# Patient Record
Sex: MALE | Race: ASIAN | NOT HISPANIC OR LATINO | URBAN - METROPOLITAN AREA
[De-identification: names, ages, dates, MRNs, and addresses within clinical notes are randomized per-mention and may not be internally consistent; named-entity substitution may affect disease eponyms.]

---

## 2019-06-27 ENCOUNTER — INPATIENT (INPATIENT)
Facility: HOSPITAL | Age: 69
LOS: 2 days | Discharge: HOME | DRG: 66 | End: 2019-06-30
Attending: INTERNAL MEDICINE | Admitting: INTERNAL MEDICINE
Payer: MEDICARE

## 2019-06-27 VITALS
RESPIRATION RATE: 16 BRPM | TEMPERATURE: 98 F | HEART RATE: 76 BPM | SYSTOLIC BLOOD PRESSURE: 127 MMHG | WEIGHT: 175.05 LBS | OXYGEN SATURATION: 94 % | DIASTOLIC BLOOD PRESSURE: 78 MMHG | HEIGHT: 65 IN

## 2019-06-27 DIAGNOSIS — F17.200 NICOTINE DEPENDENCE, UNSPECIFIED, UNCOMPLICATED: ICD-10-CM

## 2019-06-27 DIAGNOSIS — E78.5 HYPERLIPIDEMIA, UNSPECIFIED: ICD-10-CM

## 2019-06-27 DIAGNOSIS — G45.9 TRANSIENT CEREBRAL ISCHEMIC ATTACK, UNSPECIFIED: ICD-10-CM

## 2019-06-27 DIAGNOSIS — E11.9 TYPE 2 DIABETES MELLITUS WITHOUT COMPLICATIONS: ICD-10-CM

## 2019-06-27 DIAGNOSIS — R20.0 ANESTHESIA OF SKIN: ICD-10-CM

## 2019-06-27 DIAGNOSIS — Z29.9 ENCOUNTER FOR PROPHYLACTIC MEASURES, UNSPECIFIED: ICD-10-CM

## 2019-06-27 DIAGNOSIS — I10 ESSENTIAL (PRIMARY) HYPERTENSION: ICD-10-CM

## 2019-06-27 DIAGNOSIS — F10.20 ALCOHOL DEPENDENCE, UNCOMPLICATED: ICD-10-CM

## 2019-06-27 LAB
ALBUMIN SERPL ELPH-MCNC: 4.2 G/DL — SIGNIFICANT CHANGE UP (ref 3.3–5)
ALP SERPL-CCNC: 50 U/L — SIGNIFICANT CHANGE UP (ref 30–120)
ALT FLD-CCNC: 19 U/L DA — SIGNIFICANT CHANGE UP (ref 10–60)
ANION GAP SERPL CALC-SCNC: 10 MMOL/L — SIGNIFICANT CHANGE UP (ref 5–17)
APTT BLD: 24.4 SEC — LOW (ref 28.5–37)
AST SERPL-CCNC: 26 U/L — SIGNIFICANT CHANGE UP (ref 10–40)
BASOPHILS # BLD AUTO: 0.03 K/UL — SIGNIFICANT CHANGE UP (ref 0–0.2)
BASOPHILS NFR BLD AUTO: 0.5 % — SIGNIFICANT CHANGE UP (ref 0–2)
BILIRUB SERPL-MCNC: 0.5 MG/DL — SIGNIFICANT CHANGE UP (ref 0.2–1.2)
BUN SERPL-MCNC: 18 MG/DL — SIGNIFICANT CHANGE UP (ref 7–23)
CALCIUM SERPL-MCNC: 9.1 MG/DL — SIGNIFICANT CHANGE UP (ref 8.4–10.5)
CHLORIDE SERPL-SCNC: 104 MMOL/L — SIGNIFICANT CHANGE UP (ref 96–108)
CO2 SERPL-SCNC: 26 MMOL/L — SIGNIFICANT CHANGE UP (ref 22–31)
CREAT SERPL-MCNC: 1.07 MG/DL — SIGNIFICANT CHANGE UP (ref 0.5–1.3)
EOSINOPHIL # BLD AUTO: 0.61 K/UL — HIGH (ref 0–0.5)
EOSINOPHIL NFR BLD AUTO: 9.3 % — HIGH (ref 0–6)
GLUCOSE BLDC GLUCOMTR-MCNC: 128 MG/DL — HIGH (ref 70–99)
GLUCOSE BLDC GLUCOMTR-MCNC: 175 MG/DL — HIGH (ref 70–99)
GLUCOSE SERPL-MCNC: 126 MG/DL — HIGH (ref 70–99)
HCT VFR BLD CALC: 39 % — SIGNIFICANT CHANGE UP (ref 39–50)
HGB BLD-MCNC: 13.7 G/DL — SIGNIFICANT CHANGE UP (ref 13–17)
IMM GRANULOCYTES NFR BLD AUTO: 0.3 % — SIGNIFICANT CHANGE UP (ref 0–1.5)
INR BLD: 0.91 RATIO — SIGNIFICANT CHANGE UP (ref 0.88–1.16)
LYMPHOCYTES # BLD AUTO: 1.45 K/UL — SIGNIFICANT CHANGE UP (ref 1–3.3)
LYMPHOCYTES # BLD AUTO: 22 % — SIGNIFICANT CHANGE UP (ref 13–44)
MCHC RBC-ENTMCNC: 32.6 PG — SIGNIFICANT CHANGE UP (ref 27–34)
MCHC RBC-ENTMCNC: 35.1 GM/DL — SIGNIFICANT CHANGE UP (ref 32–36)
MCV RBC AUTO: 92.9 FL — SIGNIFICANT CHANGE UP (ref 80–100)
MONOCYTES # BLD AUTO: 0.6 K/UL — SIGNIFICANT CHANGE UP (ref 0–0.9)
MONOCYTES NFR BLD AUTO: 9.1 % — SIGNIFICANT CHANGE UP (ref 2–14)
NEUTROPHILS # BLD AUTO: 3.87 K/UL — SIGNIFICANT CHANGE UP (ref 1.8–7.4)
NEUTROPHILS NFR BLD AUTO: 58.8 % — SIGNIFICANT CHANGE UP (ref 43–77)
NRBC # BLD: 0 /100 WBCS — SIGNIFICANT CHANGE UP (ref 0–0)
PLATELET # BLD AUTO: 199 K/UL — SIGNIFICANT CHANGE UP (ref 150–400)
POTASSIUM SERPL-MCNC: 3.8 MMOL/L — SIGNIFICANT CHANGE UP (ref 3.5–5.3)
POTASSIUM SERPL-SCNC: 3.8 MMOL/L — SIGNIFICANT CHANGE UP (ref 3.5–5.3)
PROT SERPL-MCNC: 7.7 G/DL — SIGNIFICANT CHANGE UP (ref 6–8.3)
PROTHROM AB SERPL-ACNC: 9.9 SEC — LOW (ref 10–12.9)
RBC # BLD: 4.2 M/UL — SIGNIFICANT CHANGE UP (ref 4.2–5.8)
RBC # FLD: 11.9 % — SIGNIFICANT CHANGE UP (ref 10.3–14.5)
SODIUM SERPL-SCNC: 140 MMOL/L — SIGNIFICANT CHANGE UP (ref 135–145)
WBC # BLD: 6.58 K/UL — SIGNIFICANT CHANGE UP (ref 3.8–10.5)
WBC # FLD AUTO: 6.58 K/UL — SIGNIFICANT CHANGE UP (ref 3.8–10.5)

## 2019-06-27 PROCEDURE — 93880 EXTRACRANIAL BILAT STUDY: CPT | Mod: 26

## 2019-06-27 PROCEDURE — 93010 ELECTROCARDIOGRAM REPORT: CPT

## 2019-06-27 PROCEDURE — 70450 CT HEAD/BRAIN W/O DYE: CPT | Mod: 26

## 2019-06-27 PROCEDURE — 99285 EMERGENCY DEPT VISIT HI MDM: CPT

## 2019-06-27 PROCEDURE — 71046 X-RAY EXAM CHEST 2 VIEWS: CPT | Mod: 26

## 2019-06-27 RX ORDER — DEXTROSE 50 % IN WATER 50 %
25 SYRINGE (ML) INTRAVENOUS ONCE
Refills: 0 | Status: DISCONTINUED | OUTPATIENT
Start: 2019-06-27 | End: 2019-06-30

## 2019-06-27 RX ORDER — GLUCAGON INJECTION, SOLUTION 0.5 MG/.1ML
1 INJECTION, SOLUTION SUBCUTANEOUS ONCE
Refills: 0 | Status: DISCONTINUED | OUTPATIENT
Start: 2019-06-27 | End: 2019-06-30

## 2019-06-27 RX ORDER — DEXTROSE 50 % IN WATER 50 %
15 SYRINGE (ML) INTRAVENOUS ONCE
Refills: 0 | Status: DISCONTINUED | OUTPATIENT
Start: 2019-06-27 | End: 2019-06-30

## 2019-06-27 RX ORDER — ATORVASTATIN CALCIUM 80 MG/1
1 TABLET, FILM COATED ORAL
Qty: 0 | Refills: 0 | DISCHARGE

## 2019-06-27 RX ORDER — ENOXAPARIN SODIUM 100 MG/ML
40 INJECTION SUBCUTANEOUS DAILY
Refills: 0 | Status: DISCONTINUED | OUTPATIENT
Start: 2019-06-27 | End: 2019-06-30

## 2019-06-27 RX ORDER — SODIUM CHLORIDE 9 MG/ML
1000 INJECTION, SOLUTION INTRAVENOUS
Refills: 0 | Status: DISCONTINUED | OUTPATIENT
Start: 2019-06-27 | End: 2019-06-30

## 2019-06-27 RX ORDER — ATORVASTATIN CALCIUM 80 MG/1
40 TABLET, FILM COATED ORAL AT BEDTIME
Refills: 0 | Status: DISCONTINUED | OUTPATIENT
Start: 2019-06-27 | End: 2019-06-30

## 2019-06-27 RX ORDER — ASPIRIN/CALCIUM CARB/MAGNESIUM 324 MG
162 TABLET ORAL DAILY
Refills: 0 | Status: DISCONTINUED | OUTPATIENT
Start: 2019-06-27 | End: 2019-06-30

## 2019-06-27 RX ORDER — METFORMIN HYDROCHLORIDE 850 MG/1
0 TABLET ORAL
Qty: 0 | Refills: 0 | DISCHARGE

## 2019-06-27 RX ORDER — DEXTROSE 50 % IN WATER 50 %
12.5 SYRINGE (ML) INTRAVENOUS ONCE
Refills: 0 | Status: DISCONTINUED | OUTPATIENT
Start: 2019-06-27 | End: 2019-06-30

## 2019-06-27 RX ORDER — INSULIN LISPRO 100/ML
VIAL (ML) SUBCUTANEOUS
Refills: 0 | Status: DISCONTINUED | OUTPATIENT
Start: 2019-06-27 | End: 2019-06-30

## 2019-06-27 RX ORDER — FOLIC ACID 0.8 MG
1 TABLET ORAL DAILY
Refills: 0 | Status: DISCONTINUED | OUTPATIENT
Start: 2019-06-27 | End: 2019-06-30

## 2019-06-27 RX ORDER — THIAMINE MONONITRATE (VIT B1) 100 MG
100 TABLET ORAL DAILY
Refills: 0 | Status: DISCONTINUED | OUTPATIENT
Start: 2019-06-27 | End: 2019-06-30

## 2019-06-27 RX ORDER — LOSARTAN POTASSIUM 100 MG/1
25 TABLET, FILM COATED ORAL DAILY
Refills: 0 | Status: DISCONTINUED | OUTPATIENT
Start: 2019-06-27 | End: 2019-06-30

## 2019-06-27 RX ORDER — SODIUM CHLORIDE 9 MG/ML
3 INJECTION INTRAMUSCULAR; INTRAVENOUS; SUBCUTANEOUS ONCE
Refills: 0 | Status: COMPLETED | OUTPATIENT
Start: 2019-06-27 | End: 2019-06-27

## 2019-06-27 RX ORDER — DOXAZOSIN MESYLATE 4 MG
1 TABLET ORAL AT BEDTIME
Refills: 0 | Status: DISCONTINUED | OUTPATIENT
Start: 2019-06-27 | End: 2019-06-30

## 2019-06-27 RX ORDER — METFORMIN HYDROCHLORIDE 850 MG/1
1000 TABLET ORAL DAILY
Refills: 0 | Status: DISCONTINUED | OUTPATIENT
Start: 2019-06-27 | End: 2019-06-30

## 2019-06-27 RX ORDER — TERAZOSIN HYDROCHLORIDE 10 MG/1
1 CAPSULE ORAL
Qty: 0 | Refills: 0 | DISCHARGE

## 2019-06-27 RX ORDER — LOSARTAN POTASSIUM 100 MG/1
1 TABLET, FILM COATED ORAL
Qty: 0 | Refills: 0 | DISCHARGE

## 2019-06-27 RX ADMIN — SODIUM CHLORIDE 3 MILLILITER(S): 9 INJECTION INTRAMUSCULAR; INTRAVENOUS; SUBCUTANEOUS at 14:41

## 2019-06-27 RX ADMIN — ATORVASTATIN CALCIUM 40 MILLIGRAM(S): 80 TABLET, FILM COATED ORAL at 23:00

## 2019-06-27 RX ADMIN — Medication 162 MILLIGRAM(S): at 18:58

## 2019-06-27 RX ADMIN — ENOXAPARIN SODIUM 40 MILLIGRAM(S): 100 INJECTION SUBCUTANEOUS at 18:58

## 2019-06-27 RX ADMIN — Medication 1 MILLIGRAM(S): at 23:00

## 2019-06-27 NOTE — ED ADULT NURSE REASSESSMENT NOTE - NS ED NURSE REASSESS COMMENT FT1
pt resting comfortably, in no acute distress. Respirations are even and unlabored. pt voices no complaints at this time. pt and family updated and aware of plan of care. will cont to monitor.

## 2019-06-27 NOTE — H&P ADULT - NEUROLOGICAL DETAILS
responds to pain/deep reflexes intact/cranial nerves intact/sensation intact/responds to verbal commands/alert and oriented x 3

## 2019-06-27 NOTE — ED ADULT TRIAGE NOTE - CHIEF COMPLAINT QUOTE
according to son-in-law  Pt c/o tingling in his rt 1,2,3 fingers & numbness rt side of face since 2AM Speech clear. RON lazar

## 2019-06-27 NOTE — ED ADULT NURSE NOTE - CHPI ED NUR SYMPTOMS NEG
no dizziness/no fever/no confusion/no blurred vision/no weakness/no change in level of consciousness/no vomiting/no nausea

## 2019-06-27 NOTE — H&P ADULT - HISTORY OF PRESENT ILLNESS
69yo male with pmhx of DM2, htn, hld on asa c/o numbness since 2am. pt reports r cheek numbness with r hand "first 3 fingers" numbness. pt reports neverhad previous symptoms in the past. pt denies fever, vision changes, neck pain, headache, n/v.No weakness.  No Fall or LOC.  No c/o Headache or nausea vomiting.  Pt is Chinese speaking. Interview was conducted with daughter's help at bedside.  In Ed /78, Hr 78, afebrile, Ct head negative.  had Neuro consult-NIHSS - 1TIA/CVA in Right thlamic area clinically.Pt is not a tPa candidate secondary to woke up with the symptoms.  Admit to monitored bed.  Was taking Ecotrin 81 mg at home would change to Ecotrin 162.  Lipitor 40  MRI Brain/Carotid doppler/2D Echo.  Lipid panel/HbA1c.  Dysphagia eval in ED.  PT Eval.

## 2019-06-27 NOTE — ED ADULT NURSE NOTE - OBJECTIVE STATEMENT
recd pt  A/Ox3, as per son-in-law  Pt went to sleep at 9pm and woke up @ 200am c/o tingling to his right hand & numbness to his rt side of face, Speech clear. MAEx4 with strength and purpose. pt is everyday smoker, denies chest pain or sob. Respirations are even and unlabored, lungs cta, +bowel x4 quads, abdomen soft, nontender/nondistended, no guarding, rebound or rigidity noted, skin w/d/i.

## 2019-06-27 NOTE — ED PROVIDER NOTE - PROGRESS NOTE DETAILS
Montserrat YANG for ED attending, Dr. Aguilar : 69 y/o male chinese speaking, c/o numbness to right cheek and right three fingers since 2am. Denies HA, fever, nausea, vomiting, visual disturbance, or other sx. hx of DM2, HTN, smoker, denies prior similar sx.  PE: VSS, afebrile, NAD, PERRL, EOMI, neck supple, heart and lungs nl, abd soft, extremities FROM intact, neuro awake and alert, no opal deficits, ? sensory deficit right cheek and right 1st, 2nd, and 3rd digit but no weakness, otherwise nl exam  plan - CT bran. labs, nu consult. labs and imaging reviewed. consulted neuro dr cooper who advised admit. consulted dr sellers will admit.

## 2019-06-27 NOTE — ED PROVIDER NOTE - CLINICAL SUMMARY MEDICAL DECISION MAKING FREE TEXT BOX
pt with hand and facial numbness, no c-spine tenderness.will do labs, ct head to r/o cva, ekg, cxr, consult neuro

## 2019-06-27 NOTE — ED PROVIDER NOTE - OBJECTIVE STATEMENT
pt is a 67yo male with pmhx of DM2, htn, hld on asa c/o numbness since 2am. pt reports r cheek numbness with r hand "first 3 fingers" numbness. pt reports never had previous symptoms in the past. pt is a 67yo male with pmhx of DM2, htn, hld on asa c/o numbness since 2am. pt reports r cheek numbness with r hand "first 3 fingers" numbness. pt reports never had previous symptoms in the past. pt denies fever, vision changes, neck pain, headache, n/v.

## 2019-06-27 NOTE — ED PROVIDER NOTE - PHYSICAL EXAMINATION
Neuro- A&Ox3. Speech clear, without articulation or word-finding difficulties. Eyes- PERRL bilaterally. EOMs in tact. No nystagmus. CN II-XII in tact. No facial droop. decreased sensation r hand digits  No motor deficits throughout extremities bilaterally. Strength 5/5 throughout upper and lower extremities. No pronator drift. Gait stable. Neuro- A&Ox3. Speech clear, without articulation or word-finding difficulties. Eyes- PERRL bilaterally. EOMs in tact. No nystagmus. CN II-XII in tact. No facial droop. decreased sensation  r cheek and r hand digits 1-3. from normal sensation rest of digits.   No motor deficits throughout extremities bilaterally. Strength 5/5 throughout upper and lower extremities. No pronator drift. Gait stable.    Spine Exam:     Cervical: No erythema, ecchymosis, or visible deformity. No midline tenderness or step-off appreciated on palpation. No paravertebral tenderness. No muscle spasm. FROM. NEG NEXUS criteria.

## 2019-06-27 NOTE — CONSULT NOTE ADULT - SUBJECTIVE AND OBJECTIVE BOX
Patient is a 68y old  Male who presents with a chief complaint of Left face and hand numbness    HPI: Pt is a 67yo male with PMHx of DM2, HTN, Hypercholesterolemia came to ED with c/o numbness since 2am. Pt reports Right cheek numbness with Right hand "first 3 fingers" numbness. pt reports never had previous symptoms in the past. Pt denies fever, vision changes, neck pain, headache, n/v.  No weakness.  No Fall or LOC.  No c/o Headache or nausea vomiting.  Pt is Chinese speaking. Interview was conducted with  on phone -     PAST MEDICAL & SURGICAL HISTORY:  Dyslipidemia  DM (diabetes mellitus)  HTN (hypertension)  No significant past surgical history      Home Medications:     * Patient Currently Takes Medications as of 27-Jun-2019 14:23 documented in Structured Notes  · 	Hytrin 2 mg oral capsule: Last Dose Taken:  , 1 cap(s) orally once a day (at bedtime)  · 	metFORMIN 1000 mg oral tablet: Last Dose Taken:  , orally once a day  · 	losartan 25 mg oral tablet: Last Dose Taken:  , 1 tab(s) orally once a day  · 	aspirin 81 mg oral tablet: Last Dose Taken:  , 1 tab(s) orally once a day          · 	atorvastatin 40 mg oral tablet: Last Dose Taken:  , 1 tab(s) orally once a day    Allergies    No Known Allergies    SOCIAL HISTORY:    No h/o Smoking.   No h/o alcohol use.    FAMILY HISTORY: As per chart. N/C.    REVIEW OF SYSTEMS:    CONSTITUTIONAL: No fever  EYES: No eye pain,   ENMT:  No sinus or throat pain  NECK: No pain or stiffness  RESPIRATORY: No cough, No hemoptysis; No shortness of breath  CARDIOVASCULAR: No acute chest pain, palpitations,  or leg swelling  GASTROINTESTINAL: No abdominal pain. No nausea, vomiting, or hematemesis;  No melena or hematochezia.  GENITOURINARY: No  hematuria, or incontinence  MUSCULOSKELETAL: No joint swelling; No extremity pain  SKIN: No itching, rashes, or lesions   LYMPH NODES: No enlarged glands  NEUROLOGICAL: No headaches, memory loss,   PSYCHIATRIC: No depression, anxiety, mood swings, or difficulty sleeping  ENDOCRINE: No heat or cold intolerance;   HEME/LYMPH: No easy bruising, or bleeding gums  Allergy/Immunology. No medication allergy. No seasonal allergies.    PHYSICAL EXAM:  Vital Signs Last 24 Hrs  T(F): 98.5 (06-27-19 @ 15:23)  HR: 70 (06-27-19 @ 15:23)  BP: 128/73 (06-27-19 @ 15:23)  RR: 17 (06-27-19 @ 15:23)    GENERAL: NAD, well-groomed, well-developed  HEAD:  Atraumatic, Normocephalic  EYES: EOMI, PERRLA, conjunctiva and sclera clear  NECK: Supple, No JVD, thyroid non-palpable    On Neurological Examination:    Mental Status - Pt is alert, awake, oriented X3. Higher functions are intact.  Follows commands well and able to answer questions appropriately.    Speech -  Normal. Pt has no aphasia.    Cranial Nerves - Pupils 3 mm equal and reactive to light, extraocular eye movements intact. Pt has no visual field deficit. Pt has no facial asymmetry. Tongue - is in midline.    Motor Exam - 4 plus/5 all over, No drift. No shaking or tremors. Muscle tone - is normal all over. Moves all extremities equally. No asymmetry is seen.      Sensory Exam - Pt withdraws all extremities equally on stimulation. No asymmetry seen. Complaints of tingling, numbness on left face and left hand 3 fingers numbness    Gait - Able to stand and walk unassisted.     Deep tendon Reflexes - 2 plus all over.    Coordination - Fine finger movements are normal on both sides. Finger to nose is also normal on both sides.       Romberg - Negative.    Neck Supple -  Yes.    LABS:                        13.7   6.58  )-----------( 199      ( 27 Jun 2019 14:42 )             39.0     06-27    140  |  104  |  18  ----------------------------<  126<H>  3.8   |  26  |  1.07    Ca    9.1      27 Jun 2019 14:42    TPro  7.7  /  Alb  4.2  /  TBili  0.5  /  DBili  x   /  AST  26  /  ALT  19  /  AlkPhos  50  06-27    PT/INR - ( 27 Jun 2019 14:42 )   PT: 9.9 sec;   INR: 0.91 ratio      PTT - ( 27 Jun 2019 14:42 )  PTT:24.4 sec    RADIOLOGY & ADDITIONAL STUDIES:    < from: CT Head No Cont (06.27.19 @ 14:52) >    No evidence of acute intracranial hemorrhage, midline shift or CT   evidence of acute territorial infarct.    < end of copied text > Patient is a 68y old  Male who presents with a chief complaint of Right face and hand numbness    HPI: Pt is a 67yo male with PMHx of DM2, HTN, Hypercholesterolemia came to ED with c/o numbness since 2am. Pt reports Right cheek numbness with Right hand "first 3 fingers" numbness. pt reports never had previous symptoms in the past. Pt denies fever, vision changes, neck pain, headache, n/v.  No weakness.  No Fall or LOC.  No c/o Headache or nausea vomiting.  Pt is Chinese speaking. Interview was conducted with daughter's help at bedside.    PAST MEDICAL & SURGICAL HISTORY:  Dyslipidemia  DM (diabetes mellitus)  HTN (hypertension)  No significant past surgical history      Home Medications:     * Patient Currently Takes Medications as of 27-Jun-2019 14:23 documented in Structured Notes  · 	Hytrin 2 mg oral capsule: Last Dose Taken:  , 1 cap(s) orally once a day (at bedtime)  · 	metFORMIN 1000 mg oral tablet: Last Dose Taken:  , orally once a day  · 	losartan 25 mg oral tablet: Last Dose Taken:  , 1 tab(s) orally once a day  · 	aspirin 81 mg oral tablet: Last Dose Taken:  , 1 tab(s) orally once a day          · 	atorvastatin 40 mg oral tablet: Last Dose Taken:  , 1 tab(s) orally once a day    Allergies    No Known Allergies    SOCIAL HISTORY:    No h/o Smoking.   No h/o alcohol use.    FAMILY HISTORY: As per chart. N/C.    REVIEW OF SYSTEMS:    CONSTITUTIONAL: No fever  EYES: No eye pain,   ENMT:  No sinus or throat pain  NECK: No pain or stiffness  RESPIRATORY: No cough, No hemoptysis; No shortness of breath  CARDIOVASCULAR: No acute chest pain, palpitations,  or leg swelling  GASTROINTESTINAL: No abdominal pain. No nausea, vomiting, or hematemesis;  No melena or hematochezia.  GENITOURINARY: No  hematuria, or incontinence  MUSCULOSKELETAL: No joint swelling; No extremity pain  SKIN: No itching, rashes, or lesions   LYMPH NODES: No enlarged glands  NEUROLOGICAL: No headaches, memory loss,   PSYCHIATRIC: No depression, anxiety, mood swings, or difficulty sleeping  ENDOCRINE: No heat or cold intolerance;   HEME/LYMPH: No easy bruising, or bleeding gums  Allergy/Immunology. No medication allergy. No seasonal allergies.    PHYSICAL EXAM:  Vital Signs Last 24 Hrs  T(F): 98.5 (06-27-19 @ 15:23)  HR: 70 (06-27-19 @ 15:23)  BP: 128/73 (06-27-19 @ 15:23)  RR: 17 (06-27-19 @ 15:23)    GENERAL: NAD, well-groomed, well-developed  HEAD:  Atraumatic, Normocephalic  EYES: EOMI, PERRLA, conjunctiva and sclera clear  NECK: Supple, No JVD, thyroid non-palpable    On Neurological Examination:    Mental Status - Pt is alert, awake, oriented X3. Higher functions are intact.  Follows commands well and able to answer questions appropriately.    Speech -  Normal. Pt has no aphasia.    Cranial Nerves - Pupils 3 mm equal and reactive to light, extraocular eye movements intact. Pt has no visual field deficit. Pt has no facial asymmetry. Tongue - is in midline.    Motor Exam - 4 plus/5 all over, No drift. No shaking or tremors. Muscle tone - is normal all over. Moves all extremities equally. No asymmetry is seen.      Sensory Exam - Pt withdraws all extremities equally on stimulation. No asymmetry seen. Complaints of tingling, numbness on left face and left hand 3 fingers numbness    Gait - Able to stand and walk unassisted.     Deep tendon Reflexes - 2 plus all over.    Coordination - Fine finger movements are normal on both sides. Finger to nose is also normal on both sides.       Romberg - Negative.    Neck Supple -  Yes.    LABS:                        13.7   6.58  )-----------( 199      ( 27 Jun 2019 14:42 )             39.0     06-27    140  |  104  |  18  ----------------------------<  126<H>  3.8   |  26  |  1.07    Ca    9.1      27 Jun 2019 14:42    TPro  7.7  /  Alb  4.2  /  TBili  0.5  /  DBili  x   /  AST  26  /  ALT  19  /  AlkPhos  50  06-27    PT/INR - ( 27 Jun 2019 14:42 )   PT: 9.9 sec;   INR: 0.91 ratio      PTT - ( 27 Jun 2019 14:42 )  PTT:24.4 sec    RADIOLOGY & ADDITIONAL STUDIES:    < from: CT Head No Cont (06.27.19 @ 14:52) >    No evidence of acute intracranial hemorrhage, midline shift or CT   evidence of acute territorial infarct.    < end of copied text > Patient is a 68y old  Male who presents with a chief complaint of Right face and hand numbness    HPI: Pt is a 69yo male with PMHx of DM2, HTN, Hypercholesterolemia came to ED with c/o numbness since 2am. Pt reports Right cheek numbness with Right hand "first 3 fingers" numbness. pt reports never had previous symptoms in the past. Pt denies fever, vision changes, neck pain, headache, n/v.  No weakness.  No Fall or LOC.  No c/o Headache or nausea vomiting.  Pt is Chinese speaking. Interview was conducted with daughter's help at bedside.    PAST MEDICAL & SURGICAL HISTORY:  Dyslipidemia  DM (diabetes mellitus)  HTN (hypertension)  No significant past surgical history      Home Medications:     * Patient Currently Takes Medications as of 27-Jun-2019 14:23 documented in Structured Notes  · 	Hytrin 2 mg oral capsule: Last Dose Taken:  , 1 cap(s) orally once a day (at bedtime)  · 	metFORMIN 1000 mg oral tablet: Last Dose Taken:  , orally once a day  · 	losartan 25 mg oral tablet: Last Dose Taken:  , 1 tab(s) orally once a day  · 	aspirin 81 mg oral tablet: Last Dose Taken:  , 1 tab(s) orally once a day          · 	atorvastatin 40 mg oral tablet: Last Dose Taken:  , 1 tab(s) orally once a day    Allergies    No Known Allergies    SOCIAL HISTORY:    Currently smokes  Drinks home made rice wine 2 times a day.    FAMILY HISTORY: As per chart. N/C.    REVIEW OF SYSTEMS:    CONSTITUTIONAL: No fever  EYES: No eye pain,   ENMT:  No sinus or throat pain  NECK: No pain or stiffness  RESPIRATORY: No cough, No hemoptysis; No shortness of breath  CARDIOVASCULAR: No acute chest pain, palpitations,  or leg swelling  GASTROINTESTINAL: No abdominal pain. No nausea, vomiting, or hematemesis;  No melena or hematochezia.  GENITOURINARY: No  hematuria, or incontinence  MUSCULOSKELETAL: No joint swelling; No extremity pain  SKIN: No itching, rashes, or lesions   LYMPH NODES: No enlarged glands  NEUROLOGICAL: No headaches, memory loss,   PSYCHIATRIC: No depression, anxiety, mood swings, or difficulty sleeping  ENDOCRINE: No heat or cold intolerance;   HEME/LYMPH: No easy bruising, or bleeding gums  Allergy/Immunology. No medication allergy. No seasonal allergies.    PHYSICAL EXAM:  Vital Signs Last 24 Hrs  T(F): 98.5 (06-27-19 @ 15:23)  HR: 70 (06-27-19 @ 15:23)  BP: 128/73 (06-27-19 @ 15:23)  RR: 17 (06-27-19 @ 15:23)    GENERAL: NAD, well-groomed, well-developed  HEAD:  Atraumatic, Normocephalic  EYES: EOMI, PERRLA, conjunctiva and sclera clear  NECK: Supple, No JVD, thyroid non-palpable    On Neurological Examination:    Mental Status - Pt is alert, awake, oriented X3. Higher functions are intact.  Follows commands well and able to answer questions appropriately.    Speech -  Normal. Pt has no aphasia.    Cranial Nerves - Pupils 3 mm equal and reactive to light, extraocular eye movements intact. Pt has no visual field deficit. Pt has no facial asymmetry. Tongue - is in midline.    Motor Exam - 4 plus/5 all over, No drift. No shaking or tremors. Muscle tone - is normal all over. Moves all extremities equally. No asymmetry is seen.      Sensory Exam - Pt withdraws all extremities equally on stimulation. No asymmetry seen. Complaints of tingling, numbness on left face and left hand 3 fingers numbness    Gait - Able to stand and walk unassisted.     Deep tendon Reflexes - 2 plus all over.    Coordination - Fine finger movements are normal on both sides. Finger to nose is also normal on both sides.       Romberg - Negative.    Neck Supple -  Yes.    LABS:                        13.7   6.58  )-----------( 199      ( 27 Jun 2019 14:42 )             39.0     06-27    140  |  104  |  18  ----------------------------<  126<H>  3.8   |  26  |  1.07    Ca    9.1      27 Jun 2019 14:42    TPro  7.7  /  Alb  4.2  /  TBili  0.5  /  DBili  x   /  AST  26  /  ALT  19  /  AlkPhos  50  06-27    PT/INR - ( 27 Jun 2019 14:42 )   PT: 9.9 sec;   INR: 0.91 ratio      PTT - ( 27 Jun 2019 14:42 )  PTT:24.4 sec    RADIOLOGY & ADDITIONAL STUDIES:    < from: CT Head No Cont (06.27.19 @ 14:52) >    No evidence of acute intracranial hemorrhage, midline shift or CT   evidence of acute territorial infarct.    < end of copied text >

## 2019-06-27 NOTE — H&P ADULT - ASSESSMENT
67yo male with pmhx of DM2, htn, hld on asa c/o numbness since 2am. pt reports r cheek numbness with r hand "first 3 fingers" numbness. pt reports neverhad previous symptoms in the past. pt denies fever, vision changes, neck pain, headache, n/v.No weakness.  No Fall or LOC.  No c/o Headache or nausea vomiting.  Pt is Chinese speaking. Interview was conducted with daughter's help at bedside.  In Ed /78, Hr 78, afebrile, Ct head negative.  had Neuro consult-NIHSS - 1TIA/CVA in left thlamic area clinically.Pt is not a tPa candidate secondary to woke up with the symptoms.  Admit to monitored bed.Was taking Ecotrin 81 mg at home would change to Ecotrin 162.Lipitor 40MRI Brain/Carotid doppler/2D Echo.Lipid panel/HbA1c.Dysphagia eval in ED.PT Eval.  cardiology consult  smoking cessation discussed, counselled

## 2019-06-27 NOTE — CONSULT NOTE ADULT - ASSESSMENT
Seen for Left face and hand numbness  NIHSS - 1  CT Head - No acute pathology.  TIA/CVA in Right thlamic area clinically.  Pt is not a tPa candidate secondary to woke up with the symptoms.  Admit to monitored bed.  Was taking Ecotrin 81 mg at home would change to Ecotrin 162.  Lipitor 40  MRI Brain/Carotid doppler/2D Echo.  Lipid panel/HbA1c.  Dysphagia eval in ED.  PT Eval.  D/w ED physician Dr. Aguilar.  D/w Pt's   at bedside. Questions answered.  Would continue to follow. Seen for Right face and hand numbness  NIHSS - 1  CT Head - No acute pathology.  TIA/CVA in Right thlamic area clinically.  Pt is not a tPa candidate secondary to woke up with the symptoms.  Admit to monitored bed.  Was taking Ecotrin 81 mg at home would change to Ecotrin 162.  Lipitor 40  MRI Brain/Carotid doppler/2D Echo.  Lipid panel/HbA1c.  Dysphagia eval in ED.  PT Eval.  D/w ED physician Dr. Aguilar.  D/w Pt's daughter at bedside. Questions answered.  Would continue to follow. Seen for Right face and hand numbness  NIHSS - 1  CT Head - No acute pathology.  TIA/CVA in left thalamic area clinically.  Pt is not a tPa candidate secondary to woke up with the symptoms.  Admit to monitored bed.  Was taking Ecotrin 81 mg at home would change to Ecotrin 162.  Lipitor 40  MRI Brain/Carotid doppler/2D Echo.  Lipid panel/HbA1c.  Dysphagia eval in ED.  PT Eval.  D/w ED physician Dr. Aguilar.  D/w dr. Clement.  D/w Pt's daughter at bedside. Questions answered.  Counseling was done for Smoking cessation and also limit alcohol use socially.  Would continue to follow.

## 2019-06-27 NOTE — ED PROVIDER NOTE - CHPI ED SYMPTOMS NEG
no vomiting/no change in level of consciousness/no weakness/no blurred vision/no loss of consciousness/no dizziness

## 2019-06-28 ENCOUNTER — OUTPATIENT (OUTPATIENT)
Dept: OUTPATIENT SERVICES | Facility: HOSPITAL | Age: 69
LOS: 1 days | End: 2019-06-28
Payer: COMMERCIAL

## 2019-06-28 DIAGNOSIS — R20.0 ANESTHESIA OF SKIN: ICD-10-CM

## 2019-06-28 PROBLEM — E11.9 TYPE 2 DIABETES MELLITUS WITHOUT COMPLICATIONS: Chronic | Status: ACTIVE | Noted: 2019-06-27

## 2019-06-28 PROBLEM — I10 ESSENTIAL (PRIMARY) HYPERTENSION: Chronic | Status: ACTIVE | Noted: 2019-06-27

## 2019-06-28 PROBLEM — E78.5 HYPERLIPIDEMIA, UNSPECIFIED: Chronic | Status: ACTIVE | Noted: 2019-06-27

## 2019-06-28 LAB
ANION GAP SERPL CALC-SCNC: 7 MMOL/L — SIGNIFICANT CHANGE UP (ref 5–17)
BUN SERPL-MCNC: 12 MG/DL — SIGNIFICANT CHANGE UP (ref 7–23)
CALCIUM SERPL-MCNC: 8.6 MG/DL — SIGNIFICANT CHANGE UP (ref 8.4–10.5)
CHLORIDE SERPL-SCNC: 105 MMOL/L — SIGNIFICANT CHANGE UP (ref 96–108)
CHOLEST SERPL-MCNC: 99 MG/DL — SIGNIFICANT CHANGE UP (ref 10–199)
CO2 SERPL-SCNC: 27 MMOL/L — SIGNIFICANT CHANGE UP (ref 22–31)
CREAT SERPL-MCNC: 0.85 MG/DL — SIGNIFICANT CHANGE UP (ref 0.5–1.3)
GLUCOSE BLDC GLUCOMTR-MCNC: 113 MG/DL — HIGH (ref 70–99)
GLUCOSE BLDC GLUCOMTR-MCNC: 123 MG/DL — HIGH (ref 70–99)
GLUCOSE BLDC GLUCOMTR-MCNC: 214 MG/DL — HIGH (ref 70–99)
GLUCOSE BLDC GLUCOMTR-MCNC: 300 MG/DL — HIGH (ref 70–99)
GLUCOSE SERPL-MCNC: 130 MG/DL — HIGH (ref 70–99)
HBA1C BLD-MCNC: 7.4 % — HIGH (ref 4–5.6)
HCT VFR BLD CALC: 38.1 % — LOW (ref 39–50)
HCV AB S/CO SERPL IA: 0.08 S/CO — SIGNIFICANT CHANGE UP (ref 0–0.99)
HCV AB SERPL-IMP: SIGNIFICANT CHANGE UP
HDLC SERPL-MCNC: 44 MG/DL — SIGNIFICANT CHANGE UP
HGB BLD-MCNC: 13.1 G/DL — SIGNIFICANT CHANGE UP (ref 13–17)
LIPID PNL WITH DIRECT LDL SERPL: 25 MG/DL — SIGNIFICANT CHANGE UP
MCHC RBC-ENTMCNC: 32 PG — SIGNIFICANT CHANGE UP (ref 27–34)
MCHC RBC-ENTMCNC: 34.4 GM/DL — SIGNIFICANT CHANGE UP (ref 32–36)
MCV RBC AUTO: 92.9 FL — SIGNIFICANT CHANGE UP (ref 80–100)
NRBC # BLD: 0 /100 WBCS — SIGNIFICANT CHANGE UP (ref 0–0)
PLATELET # BLD AUTO: 195 K/UL — SIGNIFICANT CHANGE UP (ref 150–400)
POTASSIUM SERPL-MCNC: 3.7 MMOL/L — SIGNIFICANT CHANGE UP (ref 3.5–5.3)
POTASSIUM SERPL-SCNC: 3.7 MMOL/L — SIGNIFICANT CHANGE UP (ref 3.5–5.3)
RBC # BLD: 4.1 M/UL — LOW (ref 4.2–5.8)
RBC # FLD: 11.9 % — SIGNIFICANT CHANGE UP (ref 10.3–14.5)
SODIUM SERPL-SCNC: 139 MMOL/L — SIGNIFICANT CHANGE UP (ref 135–145)
TOTAL CHOLESTEROL/HDL RATIO MEASUREMENT: 2.3 RATIO — LOW (ref 3.4–9.6)
TRIGL SERPL-MCNC: 151 MG/DL — HIGH (ref 10–149)
WBC # BLD: 6.34 K/UL — SIGNIFICANT CHANGE UP (ref 3.8–10.5)
WBC # FLD AUTO: 6.34 K/UL — SIGNIFICANT CHANGE UP (ref 3.8–10.5)

## 2019-06-28 PROCEDURE — 70551 MRI BRAIN STEM W/O DYE: CPT

## 2019-06-28 PROCEDURE — 70551 MRI BRAIN STEM W/O DYE: CPT | Mod: 26

## 2019-06-28 RX ADMIN — Medication 1 TABLET(S): at 11:13

## 2019-06-28 RX ADMIN — ENOXAPARIN SODIUM 40 MILLIGRAM(S): 100 INJECTION SUBCUTANEOUS at 11:12

## 2019-06-28 RX ADMIN — Medication 1 MILLIGRAM(S): at 22:37

## 2019-06-28 RX ADMIN — Medication 6: at 08:29

## 2019-06-28 RX ADMIN — Medication 1 MILLIGRAM(S): at 11:13

## 2019-06-28 RX ADMIN — LOSARTAN POTASSIUM 25 MILLIGRAM(S): 100 TABLET, FILM COATED ORAL at 06:06

## 2019-06-28 RX ADMIN — Medication 100 MILLIGRAM(S): at 11:13

## 2019-06-28 RX ADMIN — Medication 4: at 13:23

## 2019-06-28 RX ADMIN — ATORVASTATIN CALCIUM 40 MILLIGRAM(S): 80 TABLET, FILM COATED ORAL at 22:37

## 2019-06-28 RX ADMIN — Medication 162 MILLIGRAM(S): at 11:55

## 2019-06-28 RX ADMIN — METFORMIN HYDROCHLORIDE 1000 MILLIGRAM(S): 850 TABLET ORAL at 11:13

## 2019-06-28 NOTE — CONSULT NOTE ADULT - SUBJECTIVE AND OBJECTIVE BOX
History of Present Illness: The patient is a 68 year old male with a history of HTN, HL, DM who presents with numbness of right side of face and of first three digits of right hand. Symptoms began yesterday morning. No changes in speech, vision, no weakness. No palpitations, dizziness, chest pain, shortness of breath.    Past Medical/Surgical History:  HTN, HL, DM    Medications:  Home Medications:  aspirin 81 mg oral tablet: 1 tab(s) orally once a day (27 Jun 2019 15:37)  atorvastatin 40 mg oral tablet: 1 tab(s) orally once a day (27 Jun 2019 15:37)  Hytrin 2 mg oral capsule: 1 cap(s) orally once a day (at bedtime) (27 Jun 2019 15:37)  losartan 25 mg oral tablet: 1 tab(s) orally once a day (27 Jun 2019 15:37)  metFORMIN 1000 mg oral tablet: orally once a day (27 Jun 2019 15:37)      Family History: Non-contributory family history of premature cardiovascular atherosclerotic disease    Social History: (+) tobacco, (+) alcohol, no drug use    Review of Systems:  General: No fevers, chills, weight loss or gain  Skin: No rashes, color changes  Cardiovascular: No chest pain, orthopnea  Respiratory: No shortness of breath, cough  Gastrointestinal: No nausea, abdominal pain  Genitourinary: No incontinence, pain with urination  Musculoskeletal: No pain, swelling, decreased range of motion  Neurological: No headache, weakness  Psychiatric: No depression, anxiety  Endocrine: No weight loss or gain, increased thirst  All other systems are comprehensively negative.    Physical Exam:  Vitals:        Vital Signs Last 24 Hrs  T(C): 36.9 (28 Jun 2019 07:23), Max: 36.9 (27 Jun 2019 15:23)  T(F): 98.4 (28 Jun 2019 07:23), Max: 98.5 (27 Jun 2019 15:23)  HR: 76 (28 Jun 2019 07:23) (57 - 76)  BP: 132/81 (28 Jun 2019 07:23) (117/74 - 141/81)  BP(mean): --  RR: 20 (28 Jun 2019 07:23) (14 - 20)  SpO2: 96% (28 Jun 2019 07:23) (94% - 97%)  General: NAD  HEENT: MMM  Neck: No JVD, no carotid bruit  Lungs: CTAB  CV: RRR, nl S1/S2, no M/R/G  Abdomen: S/NT/ND, +BS  Extremities: No LE edema, no cyanosis  Neuro: AAOx3, non-focal  Skin: No rash    Labs:                        13.1   6.34  )-----------( 195      ( 28 Jun 2019 06:10 )             38.1     06-28    139  |  105  |  12  ----------------------------<  130<H>  3.7   |  27  |  0.85    Ca    8.6      28 Jun 2019 06:10    TPro  7.7  /  Alb  4.2  /  TBili  0.5  /  DBili  x   /  AST  26  /  ALT  19  /  AlkPhos  50  06-27        PT/INR - ( 27 Jun 2019 14:42 )   PT: 9.9 sec;   INR: 0.91 ratio         PTT - ( 27 Jun 2019 14:42 )  PTT:24.4 sec    ECG: NSR, normal axis, no ST abnormality

## 2019-06-28 NOTE — SWALLOW BEDSIDE ASSESSMENT ADULT - SWALLOW EVAL: DIAGNOSIS
1. Functional oral management of puree and thin liquids marked by adequate collection , transfer and transport. 2. Mild oral dysphagia for solids marked by prolonged mastication and delayed a-p transport time. Mild lingual stasis which reduced with liquid wash. 3. Mild pharyngeal dysphagia for puree and thin liquids marked by reduced laryngeal elevation upon palpation. However, no clinical evidence of airway penetration. 4. Mild pharyngeal dysphagia for solids marked by delayed swallow trigger with reduced laryngeal elevation upon palpation. No clinical evidence of airway penetration.

## 2019-06-28 NOTE — PROGRESS NOTE ADULT - ATTENDING COMMENTS
45 minutes spent on this visit, 50% visit time spent in care co-ordination with other attendings and counselling patient  I have discussed care plan with patient and HCP,expressed understanding of problems treatment and their effect and side effects, alternatives in detail,I have asked if they have any questions and concerns and appropriately addressed them to best of my ability  Reviewed all diagonostic tests, lab results and drug drug interactions, and medications

## 2019-06-28 NOTE — PROGRESS NOTE ADULT - ASSESSMENT
67yo male with pmhx of DM2, htn, hld on asa c/o numbness since 2am. pt reports r cheek numbness with r hand "first 3 fingers" numbness. pt reports neverhad previous symptoms in the past. pt denies fever, vision changes, neck pain, headache, n/v.No weakness.  No Fall or LOC.  No c/o Headache or nausea vomiting.  Pt is Chinese speaking. Interview was conducted with daughter's help at bedside.  In Ed /78, Hr 76, afebrile, Ct head negative.  had Neuro consult-[facility] - 1TIA/CVA in left thlamic area clinically.Pt is not a tPa candidate secondary to woke up with the symptoms.  Admit to monitored bed.Was taking Ecotrin 81 mg at home would change to Ecotrin 162.Lipitor 40MRI Brain/Carotid doppler/2D Echo.Lipid panel/HbA1c.Dysphagia eval in ED.PT Eval.needs MRI, awaiting same  cardiology consult  smoking cessation discussed, counselled

## 2019-06-28 NOTE — SWALLOW BEDSIDE ASSESSMENT ADULT - ASR SWALLOW ASPIRATION MONITOR
cough/fever/upper respiratory infection/change of breathing pattern/position upright (90Y)/gurgly voice/throat clearing/oral hygiene/pneumonia

## 2019-06-28 NOTE — PHYSICAL THERAPY INITIAL EVALUATION ADULT - PERTINENT HX OF CURRENT PROBLEM, REHAB EVAL
pt is a 67 y/o male, admitted from home, pt reports r cheek numbness with r hand "first 3 fingers" numbness. MRI shows Acute lacunar infarct left thalamus.

## 2019-06-28 NOTE — SWALLOW BEDSIDE ASSESSMENT ADULT - COMMENTS
Patient seen at bedside at which time he was alert and cooperative. Patient seen at bedside at which time he was alert and cooperative.  As per chart review , 67yo male with pmhx of DM2, htn, hld on asa c/o numbness since 2am. pt reports r cheek numbness with r hand "first 3 fingers" numbness.

## 2019-06-28 NOTE — PROGRESS NOTE ADULT - SUBJECTIVE AND OBJECTIVE BOX
Neurology Follow up note    MARÍA GALICIALOIS 68y Male    HPI: 69yo male with pmhx of DM2, htn, hld on asa c/o numbness since 2am. pt reports r cheek numbness with r hand "first 3 fingers" numbness. pt reports never had previous symptoms in the past. pt denies fever, vision changes, neck pain, headache, n/v. No weakness.  No Fall or LOC.  No c/o Headache or nausea vomiting.  Pt is Chinese speaking. Interview was conducted with daughter's help at bedside.    Interval History -    Patient is seen, chart was reviewed and case was discussed with the treatment team.  Right face and hand numbness remains.    Vital Signs Last 24 Hrs  T(C): 36.9 (28 Jun 2019 07:23), Max: 36.9 (27 Jun 2019 15:23)  T(F): 98.4 (28 Jun 2019 07:23), Max: 98.5 (27 Jun 2019 15:23)  HR: 76 (28 Jun 2019 07:23) (57 - 76)  BP: 132/81 (28 Jun 2019 07:23) (117/74 - 141/81)  BP(mean): --  RR: 20 (28 Jun 2019 07:23) (14 - 20)  SpO2: 96% (28 Jun 2019 07:23) (94% - 97%)    Height (cm): 165.1 (06-27 @ 14:14)  Weight (kg): 79.4 (06-27 @ 14:14)  BMI (kg/m2): 29.1 (06-27 @ 14:14)    MEDICATIONS    aspirin  chewable 162 milliGRAM(s) Oral daily  atorvastatin 40 milliGRAM(s) Oral at bedtime  dextrose 40% Gel 15 Gram(s) Oral once PRN  dextrose 5%. 1000 milliLiter(s) IV Continuous <Continuous>  dextrose 50% Injectable 12.5 Gram(s) IV Push once  dextrose 50% Injectable 25 Gram(s) IV Push once  dextrose 50% Injectable 25 Gram(s) IV Push once  doxazosin 1 milliGRAM(s) Oral at bedtime  enoxaparin Injectable 40 milliGRAM(s) SubCutaneous daily  folic acid 1 milliGRAM(s) Oral daily  glucagon  Injectable 1 milliGRAM(s) IntraMuscular once PRN  insulin lispro (HumaLOG) corrective regimen sliding scale   SubCutaneous three times a day before meals  LORazepam   Injectable 1 milliGRAM(s) IntraMuscular every 6 hours PRN  losartan 25 milliGRAM(s) Oral daily  metFORMIN 1000 milliGRAM(s) Oral daily  multivitamin 1 Tablet(s) Oral daily  thiamine 100 milliGRAM(s) Oral daily    Allergies    No Known Allergies      REVIEW OF SYSTEMS:    CONSTITUTIONAL: No fever  EYES: No eye pain,   ENMT:  No sinus or throat pain  NECK: No pain or stiffness  RESPIRATORY: No cough, No hemoptysis; No shortness of breath  CARDIOVASCULAR: No acute chest pain, palpitations,  or leg swelling  GASTROINTESTINAL: No abdominal pain. No nausea, vomiting, or hematemesis;  No melena or hematochezia.  GENITOURINARY: No  hematuria, or incontinence  MUSCULOSKELETAL: No joint swelling; No extremity pain  SKIN: No itching, rashes, or lesions   LYMPH NODES: No enlarged glands  NEUROLOGICAL: No headaches, memory loss,   PSYCHIATRIC: No depression, anxiety, mood swings, or difficulty sleeping  ENDOCRINE: No heat or cold intolerance;   HEME/LYMPH: No easy bruising, or bleeding gums  Allergy/Immunology. No medication allergy. No seasonal allergies.    PHYSICAL EXAM:    GENERAL: NAD, well-groomed, well-developed  HEAD:  Atraumatic, Normocephalic  EYES: EOMI, PERRLA, conjunctiva and sclera clear  NECK: Supple, No JVD, thyroid non-palpable    On Neurological Examination:    Mental Status - Pt is alert, awake, oriented X3. Higher functions are intact.  Follows commands well and able to answer questions appropriately.    Speech -  Normal. Pt has no aphasia.    Cranial Nerves - Pupils 3 mm equal and reactive to light, extraocular eye movements intact. Pt has no visual field deficit. Pt has no facial asymmetry. Tongue - is in midline.    Motor Exam - 4 plus/5 all over, No drift. No shaking or tremors. Muscle tone - is normal all over. Moves all extremities equally. No asymmetry is seen.      Sensory Exam - Pt withdraws all extremities equally on stimulation. No asymmetry seen. Complaints of tingling, numbness on left face and left hand 3 fingers numbness    Gait - Able to stand and walk unassisted.     Deep tendon Reflexes - 2 plus all over.    Coordination - Fine finger movements are normal on both sides. Finger to nose is also normal on both sides.       Romberg - Negative.    Neck Supple -  Yes.    LABS:    CBC Full  -  ( 28 Jun 2019 06:10 )  WBC Count : 6.34 K/uL  RBC Count : 4.10 M/uL  Hemoglobin : 13.1 g/dL  Hematocrit : 38.1 %  Platelet Count - Automated : 195 K/uL  Mean Cell Volume : 92.9 fl  Mean Cell Hemoglobin : 32.0 pg  Mean Cell Hemoglobin Concentration : 34.4 gm/dL    06-28    139  |  105  |  12  ----------------------------<  130<H>  3.7   |  27  |  0.85    Ca    8.6      28 Jun 2019 06:10    TPro  7.7  /  Alb  4.2  /  TBili  0.5  /  DBili  x   /  AST  26  /  ALT  19  /  AlkPhos  50  06-27    Hemoglobin A1C: Pending    RADIOLOGY & ADDITIONAL STUDIES:    < from: US Duplex Carotid Arteries Complete, Bilateral (06.27.19 @ 21:25) >  IMPRESSION:    No duplex evidence of hemodynamically significant internal carotid artery   stenosis.     < end of copied text >      < from: CT Head No Cont (06.27.19 @ 14:52) >    No evidence of acute intracranial hemorrhage, midline shift or CT   evidence of acute territorial infarct.    < end of copied text >

## 2019-06-28 NOTE — CONSULT NOTE ADULT - ASSESSMENT
The patient is a 68 year old male with a history of HTN, HL, DM who is admitted with possible CVA.    Plan:  - ECG with no evidence of ischemia or infarction  - Telemetry consistent with sinus rhythm  - Continue aspirin  - Continue atorvastatin 40 mg daily  - Continue losartan 25 mg daily  - Echocardiogram with normal LV systolic function, no significant valve issues  - CT head negative for acute pathology  - MRI head today  - Follow-up as outpatient for event monitoring

## 2019-06-28 NOTE — PROGRESS NOTE ADULT - SUBJECTIVE AND OBJECTIVE BOX
Patient is a 68y old  Male who presents with a chief complaint of Right  face and hand numbness (27 Jun 2019 17:57)      INTERVAL HPI/OVERNIGHT EVENTS:no ac event , pt feels better , still numbness on Rt side of face    Home Medications:  aspirin 81 mg oral tablet: 1 tab(s) orally once a day (27 Jun 2019 15:37)  atorvastatin 40 mg oral tablet: 1 tab(s) orally once a day (27 Jun 2019 15:37)  Hytrin 2 mg oral capsule: 1 cap(s) orally once a day (at bedtime) (27 Jun 2019 15:37)  losartan 25 mg oral tablet: 1 tab(s) orally once a day (27 Jun 2019 15:37)  metFORMIN 1000 mg oral tablet: orally once a day (27 Jun 2019 15:37)      MEDICATIONS  (STANDING):  aspirin  chewable 162 milliGRAM(s) Oral daily  atorvastatin 40 milliGRAM(s) Oral at bedtime  dextrose 5%. 1000 milliLiter(s) (50 mL/Hr) IV Continuous <Continuous>  dextrose 50% Injectable 12.5 Gram(s) IV Push once  dextrose 50% Injectable 25 Gram(s) IV Push once  dextrose 50% Injectable 25 Gram(s) IV Push once  doxazosin 1 milliGRAM(s) Oral at bedtime  enoxaparin Injectable 40 milliGRAM(s) SubCutaneous daily  folic acid 1 milliGRAM(s) Oral daily  insulin lispro (HumaLOG) corrective regimen sliding scale   SubCutaneous three times a day before meals  losartan 25 milliGRAM(s) Oral daily  metFORMIN 1000 milliGRAM(s) Oral daily  multivitamin 1 Tablet(s) Oral daily  thiamine 100 milliGRAM(s) Oral daily    MEDICATIONS  (PRN):  dextrose 40% Gel 15 Gram(s) Oral once PRN Blood Glucose LESS THAN 70 milliGRAM(s)/deciliter  glucagon  Injectable 1 milliGRAM(s) IntraMuscular once PRN Glucose LESS THAN 70 milligrams/deciliter  LORazepam   Injectable 1 milliGRAM(s) IntraMuscular every 6 hours PRN Anxiety      Allergies    No Known Allergies    Intolerances        REVIEW OF SYSTEMS:  CONSTITUTIONAL: No fever, weight loss, or fatigue  EYES: No eye pain, visual disturbances, or discharge  ENMT:  No difficulty hearing, tinnitus, vertigo; No sinus or throat pain  NECK: No pain or stiffness  BREASTS: No pain, masses, or nipple discharge  RESPIRATORY: No cough, wheezing, chills or hemoptysis; No shortness of breath  CARDIOVASCULAR: No chest pain, palpitations, dizziness, or leg swelling  GASTROINTESTINAL: No abdominal or epigastric pain. No nausea, vomiting, or hematemesis; No diarrhea or constipation. No melena or hematochezia.  GENITOURINARY: No dysuria, frequency, hematuria, or incontinence  NEUROLOGICAL: No headaches, memory loss, loss of strength,Has rt face and arm numbness, no tremors  SKIN: No itching, burning, rashes, or lesions   LYMPH NODES: No enlarged glands  ENDOCRINE: No heat or cold intolerance; No hair loss  MUSCULOSKELETAL: No joint pain or swelling; No muscle, back, or extremity pain  PSYCHIATRIC: No depression, anxiety, mood swings, or difficulty sleeping  HEME/LYMPH: No easy bruising, or bleeding gums  ALLERGY AND IMMUNOLOGIC: No hives or eczema    Vital Signs Last 24 Hrs  T(C): 36.9 (28 Jun 2019 07:23), Max: 36.9 (27 Jun 2019 15:23)  T(F): 98.4 (28 Jun 2019 07:23), Max: 98.5 (27 Jun 2019 15:23)  HR: 76 (28 Jun 2019 07:23) (57 - 76)  BP: 132/81 (28 Jun 2019 07:23) (117/74 - 141/81)  BP(mean): --  RR: 20 (28 Jun 2019 07:23) (14 - 20)  SpO2: 96% (28 Jun 2019 07:23) (94% - 97%)    PHYSICAL EXAM:  GENERAL: NAD, well-groomed, well-developed  HEAD:  Atraumatic, Normocephalic  EYES: EOMI, PERRLA, conjunctiva and sclera clear  ENMT: Moist mucous membranes,   NECK: Supple, No JVD, Normal thyroid  NERVOUS SYSTEM:  Alert & Oriented X3, Good concentration; Motor Strength 4/5 B/L upper and lower extremities; DTRs 2+ intact and symmetric, sensations blunted on Rt side of face and and Rt Arm  CHEST/LUNG: Clear to percussion bilaterally; No rales, rhonchi, wheezing, or rubs  HEART: Regular rate and rhythm; No murmurs, rubs, or gallops  ABDOMEN: Soft, Nontender, Nondistended; Bowel sounds present  EXTREMITIES:  2+ Peripheral Pulses, No clubbing, cyanosis, or edema  LYMPH: No lymphadenopathy noted  SKIN: No rashes or lesions    LABS:                        13.1   6.34  )-----------( 195      ( 28 Jun 2019 06:10 )             38.1     06-28    139  |  105  |  12  ----------------------------<  130<H>  3.7   |  27  |  0.85    Ca    8.6      28 Jun 2019 06:10    TPro  7.7  /  Alb  4.2  /  TBili  0.5  /  DBili  x   /  AST  26  /  ALT  19  /  AlkPhos  50  06-27    PT/INR - ( 27 Jun 2019 14:42 )   PT: 9.9 sec;   INR: 0.91 ratio         PTT - ( 27 Jun 2019 14:42 )  PTT:24.4 sec    CAPILLARY BLOOD GLUCOSE      POCT Blood Glucose.: 300 mg/dL (28 Jun 2019 08:08)  POCT Blood Glucose.: 128 mg/dL (27 Jun 2019 23:08)  POCT Blood Glucose.: 175 mg/dL (27 Jun 2019 19:04)  POCT Blood Glucose.: 124 mg/dL (27 Jun 2019 14:16)          I&O's Summary      RADIOLOGY & ADDITIONAL TESTS:    Imaging Personally Reviewed:  [ x] YES  [ ] NO    Consultant(s) Notes Reviewed:  [x ] YES  [ ] NO    Care Discussed with Consultants/Other Providers [ x] YES  [ ] NO

## 2019-06-28 NOTE — PROGRESS NOTE ADULT - SUBJECTIVE AND OBJECTIVE BOX
MRI Brain   - Acute lacunar Infarct in Left Thalamus  Continue current treatment.  I called son Shayy Garcia at [phone]  - left message with my call back number.  D/w Dr. Clement.

## 2019-06-29 LAB
GLUCOSE BLDC GLUCOMTR-MCNC: 108 MG/DL — HIGH (ref 70–99)
GLUCOSE BLDC GLUCOMTR-MCNC: 114 MG/DL — HIGH (ref 70–99)
GLUCOSE BLDC GLUCOMTR-MCNC: 139 MG/DL — HIGH (ref 70–99)
GLUCOSE BLDC GLUCOMTR-MCNC: 141 MG/DL — HIGH (ref 70–99)

## 2019-06-29 RX ADMIN — ENOXAPARIN SODIUM 40 MILLIGRAM(S): 100 INJECTION SUBCUTANEOUS at 12:48

## 2019-06-29 RX ADMIN — Medication 1 MILLIGRAM(S): at 12:47

## 2019-06-29 RX ADMIN — Medication 162 MILLIGRAM(S): at 12:50

## 2019-06-29 RX ADMIN — Medication 100 MILLIGRAM(S): at 12:49

## 2019-06-29 RX ADMIN — LOSARTAN POTASSIUM 25 MILLIGRAM(S): 100 TABLET, FILM COATED ORAL at 12:47

## 2019-06-29 RX ADMIN — Medication 1 TABLET(S): at 12:48

## 2019-06-29 RX ADMIN — ATORVASTATIN CALCIUM 40 MILLIGRAM(S): 80 TABLET, FILM COATED ORAL at 21:39

## 2019-06-29 RX ADMIN — Medication 1 MILLIGRAM(S): at 21:39

## 2019-06-29 RX ADMIN — METFORMIN HYDROCHLORIDE 1000 MILLIGRAM(S): 850 TABLET ORAL at 12:47

## 2019-06-29 NOTE — PROGRESS NOTE ADULT - SUBJECTIVE AND OBJECTIVE BOX
Patient is a 68y old  Male who presents with a chief complaint of Right  face and hand numbness (28 Jun 2019 09:52)      INTERVAL HPI/OVERNIGHT EVENTS:no acute event overnight    Home Medications:  aspirin 81 mg oral tablet: 1 tab(s) orally once a day (27 Jun 2019 15:37)  atorvastatin 40 mg oral tablet: 1 tab(s) orally once a day (27 Jun 2019 15:37)  Hytrin 2 mg oral capsule: 1 cap(s) orally once a day (at bedtime) (27 Jun 2019 15:37)  losartan 25 mg oral tablet: 1 tab(s) orally once a day (27 Jun 2019 15:37)  metFORMIN 1000 mg oral tablet: orally once a day (27 Jun 2019 15:37)      MEDICATIONS  (STANDING):  aspirin  chewable 162 milliGRAM(s) Oral daily  atorvastatin 40 milliGRAM(s) Oral at bedtime  dextrose 5%. 1000 milliLiter(s) (50 mL/Hr) IV Continuous <Continuous>  dextrose 50% Injectable 12.5 Gram(s) IV Push once  dextrose 50% Injectable 25 Gram(s) IV Push once  dextrose 50% Injectable 25 Gram(s) IV Push once  doxazosin 1 milliGRAM(s) Oral at bedtime  enoxaparin Injectable 40 milliGRAM(s) SubCutaneous daily  folic acid 1 milliGRAM(s) Oral daily  insulin lispro (HumaLOG) corrective regimen sliding scale   SubCutaneous three times a day before meals  losartan 25 milliGRAM(s) Oral daily  metFORMIN 1000 milliGRAM(s) Oral daily  multivitamin 1 Tablet(s) Oral daily  thiamine 100 milliGRAM(s) Oral daily    MEDICATIONS  (PRN):  dextrose 40% Gel 15 Gram(s) Oral once PRN Blood Glucose LESS THAN 70 milliGRAM(s)/deciliter  glucagon  Injectable 1 milliGRAM(s) IntraMuscular once PRN Glucose LESS THAN 70 milligrams/deciliter  LORazepam   Injectable 1 milliGRAM(s) IntraMuscular every 6 hours PRN Anxiety      Allergies    No Known Allergies    Intolerances        REVIEW OF SYSTEMS:  CONSTITUTIONAL: No fever, weight loss, or fatigue  EYES: No eye pain, visual disturbances, or discharge  ENMT:  No difficulty hearing, tinnitus, vertigo; No sinus or throat pain  NECK: No pain or stiffness  BREASTS: No pain, masses, or nipple discharge  RESPIRATORY: No cough, wheezing, chills or hemoptysis; No shortness of breath  CARDIOVASCULAR: No chest pain, palpitations, dizziness, or leg swelling  GASTROINTESTINAL: No abdominal or epigastric pain. No nausea, vomiting, or hematemesis; No diarrhea or constipation. No melena or hematochezia.  GENITOURINARY: No dysuria, frequency, hematuria, or incontinence  NEUROLOGICAL: No headaches, memory loss, loss of strength,has facial  numbness and finger, or tremors  SKIN: No itching, burning, rashes, or lesions   LYMPH NODES: No enlarged glands  ENDOCRINE: No heat or cold intolerance; No hair loss  MUSCULOSKELETAL: No joint pain or swelling; No muscle, back, or extremity pain  PSYCHIATRIC: No depression, anxiety, mood swings, or difficulty sleeping  HEME/LYMPH: No easy bruising, or bleeding gums  ALLERGY AND IMMUNOLOGIC: No hives or eczema    Vital Signs Last 24 Hrs  T(C): 36.9 (29 Jun 2019 07:46), Max: 37 (28 Jun 2019 16:04)  T(F): 98.4 (29 Jun 2019 07:46), Max: 98.6 (28 Jun 2019 16:04)  HR: 71 (29 Jun 2019 07:46) (65 - 78)  BP: 127/73 (29 Jun 2019 07:46) (104/64 - 142/69)  BP(mean): --  RR: 16 (29 Jun 2019 07:46) (14 - 17)  SpO2: 95% (29 Jun 2019 07:46) (95% - 98%)    PHYSICAL EXAM:  GENERAL: , well-groomed, well-developed  HEAD:  Atraumatic, Normocephalic  EYES: EOMI, PERRLA, conjunctiva and sclera clear  ENMT: Moist mucous membranes,   NECK: Supple, No JVD, Normal thyroid  NERVOUS SYSTEM:  Alert & Oriented X3, Good concentration; Motor Strength 5/5 B/L upper and lower extremities; DTRs 2+ intact and symmetric  CHEST/LUNG: Clear to percussion bilaterally; No rales, rhonchi, wheezing, or rubs  HEART: Regular rate and rhythm; No murmurs, rubs, or gallops  ABDOMEN: Soft, Nontender, Nondistended; Bowel sounds present  EXTREMITIES:  2+ Peripheral Pulses, No clubbing, cyanosis, or edema  SKIN: No rashes or lesions    LABS:                        13.1   6.34  )-----------( 195      ( 28 Jun 2019 06:10 )             38.1     06-28    139  |  105  |  12  ----------------------------<  130<H>  3.7   |  27  |  0.85    Ca    8.6      28 Jun 2019 06:10    TPro  7.7  /  Alb  4.2  /  TBili  0.5  /  DBili  x   /  AST  26  /  ALT  19  /  AlkPhos  50  06-27    PT/INR - ( 27 Jun 2019 14:42 )   PT: 9.9 sec;   INR: 0.91 ratio         PTT - ( 27 Jun 2019 14:42 )  PTT:24.4 sec    CAPILLARY BLOOD GLUCOSE      POCT Blood Glucose.: 141 mg/dL (29 Jun 2019 07:42)  POCT Blood Glucose.: 123 mg/dL (28 Jun 2019 21:44)  POCT Blood Glucose.: 113 mg/dL (28 Jun 2019 17:21)  POCT Blood Glucose.: 214 mg/dL (28 Jun 2019 12:34)          I&O's Summary    28 Jun 2019 07:01  -  29 Jun 2019 07:00  --------------------------------------------------------  IN: 0 mL / OUT: 1200 mL / NET: -1200 mL        RADIOLOGY & ADDITIONAL TESTS:    Imaging Personally Reviewed:  [ x] YES  [ ] NO    Consultant(s) Notes Reviewed:  [ x] YES  [ ] NO    Care Discussed with Consultants/Other Providers [x ] YES  [ ] NO

## 2019-06-29 NOTE — PROGRESS NOTE ADULT - SUBJECTIVE AND OBJECTIVE BOX
Neurology Follow up note    JESSICA GALICIA 68y Male    HPI: 67yo male with pmhx of DM2, htn, hld on asa c/o numbness since 2am. pt reports r cheek numbness with r hand "first 3 fingers" numbness. pt reports neverhad previous symptoms in the past. pt denies fever, vision changes, neck pain, headache, n/v.No weakness.  No Fall or LOC.  No c/o Headache or nausea vomiting.  Pt is Chinese speaking. Interview was conducted with daughter's help at bedside.   DRAFT    PT Tony. (27 Jun 2019 18:30)    Interval History -    Patient is seen, chart was reviewed and case was discussed with the treatment team.  Pt is not in any distress.   Daughter is at bedside.    Vital Signs Last 24 Hrs  T(C): 36.8 (29 Jun 2019 15:23), Max: 36.9 (29 Jun 2019 07:46)  T(F): 98.3 (29 Jun 2019 15:23), Max: 98.4 (29 Jun 2019 07:46)  HR: 59 (29 Jun 2019 15:23) (59 - 78)  BP: 109/63 (29 Jun 2019 15:23) (104/64 - 132/74)  BP(mean): --  RR: 18 (29 Jun 2019 15:23) (16 - 18)  SpO2: 99% (29 Jun 2019 15:23) (95% - 99%)    MEDICATIONS    aspirin  chewable 162 milliGRAM(s) Oral daily  atorvastatin 40 milliGRAM(s) Oral at bedtime  dextrose 40% Gel 15 Gram(s) Oral once PRN  dextrose 5%. 1000 milliLiter(s) IV Continuous <Continuous>  dextrose 50% Injectable 12.5 Gram(s) IV Push once  dextrose 50% Injectable 25 Gram(s) IV Push once  dextrose 50% Injectable 25 Gram(s) IV Push once  doxazosin 1 milliGRAM(s) Oral at bedtime  enoxaparin Injectable 40 milliGRAM(s) SubCutaneous daily  folic acid 1 milliGRAM(s) Oral daily  glucagon  Injectable 1 milliGRAM(s) IntraMuscular once PRN  insulin lispro (HumaLOG) corrective regimen sliding scale   SubCutaneous three times a day before meals  LORazepam   Injectable 1 milliGRAM(s) IntraMuscular every 6 hours PRN  losartan 25 milliGRAM(s) Oral daily  metFORMIN 1000 milliGRAM(s) Oral daily  multivitamin 1 Tablet(s) Oral daily  thiamine 100 milliGRAM(s) Oral daily    Allergies    No Known Allergies    REVIEW OF SYSTEMS:    CONSTITUTIONAL: No fever  EYES: No eye pain,   ENMT:  No sinus or throat pain  NECK: No pain or stiffness  RESPIRATORY: No cough, No hemoptysis; No shortness of breath  CARDIOVASCULAR: No acute chest pain, palpitations,  or leg swelling  GASTROINTESTINAL: No abdominal pain. No nausea, vomiting, or hematemesis;  No melena or hematochezia.  GENITOURINARY: No  hematuria, or incontinence  MUSCULOSKELETAL: No joint swelling; No extremity pain  SKIN: No itching, rashes, or lesions   LYMPH NODES: No enlarged glands  NEUROLOGICAL: No headaches, memory loss,   PSYCHIATRIC: No depression, anxiety, mood swings, or difficulty sleeping  ENDOCRINE: No heat or cold intolerance;   HEME/LYMPH: No easy bruising, or bleeding gums  Allergy/Immunology. No medication allergy. No seasonal allergies.    PHYSICAL EXAM:    GENERAL: NAD, well-groomed, well-developed  HEAD:  Atraumatic, Normocephalic  EYES: EOMI, PERRLA, conjunctiva and sclera clear  NECK: Supple, No JVD, thyroid non-palpable    On Neurological Examination:    Mental Status - Pt is alert, awake, oriented X3. Higher functions are intact.  Follows commands well and able to answer questions appropriately.    Speech -  Normal. Pt has no aphasia.    Cranial Nerves - Pupils 3 mm equal and reactive to light, extraocular eye movements intact. Pt has no visual field deficit. Pt has no facial asymmetry. Tongue - is in midline.    Motor Exam - 4 plus/5 all over, No drift. No shaking or tremors. Muscle tone - is normal all over. Moves all extremities equally. No asymmetry is seen.      Sensory Exam - Pt withdraws all extremities equally on stimulation. No asymmetry seen. Complaints of tingling, numbness on left face and left hand 3 fingers numbness    Gait - Able to stand and walk unassisted.     Deep tendon Reflexes - 2 plus all over.    Coordination - Fine finger movements are normal on both sides. Finger to nose is also normal on both sides.       Romberg - Negative.    Neck Supple -  Yes.    LABS:    CBC Full  -  ( 28 Jun 2019 06:10 )  WBC Count : 6.34 K/uL  RBC Count : 4.10 M/uL  Hemoglobin : 13.1 g/dL  Hematocrit : 38.1 %  Platelet Count - Automated : 195 K/uL  Mean Cell Volume : 92.9 fl  Mean Cell Hemoglobin : 32.0 pg  Mean Cell Hemoglobin Concentration : 34.4 gm/dL    06-28    139  |  105  |  12  ----------------------------<  130<H>  3.7   |  27  |  0.85    Ca    8.6      28 Jun 2019 06:10      Hemoglobin A1C: Pending    RADIOLOGY & ADDITIONAL STUDIES:    < from: US Duplex Carotid Arteries Complete, Bilateral (06.27.19 @ 21:25) >  No duplex evidence of hemodynamically significant internal carotid artery   stenosis.       < end of copied text >      < from: MR Head No Cont (06.28.19 @ 09:56) >  Impression: Acute lacunar infarct left thalamus.    < end of copied text >      < from: US Duplex Carotid Arteries Complete, Bilateral (06.27.19 @ 21:25) >  IMPRESSION:    No duplex evidence of hemodynamically significant internal carotid artery   stenosis.     < end of copied text >      < from: CT Head No Cont (06.27.19 @ 14:52) >    No evidence of acute intracranial hemorrhage, midline shift or CT   evidence of acute territorial infarct.    < end of copied text > Neurology Follow up note    JESSICA GALICIA 68y Male    HPI: 69yo male with pmhx of DM2, htn, hld on asa c/o numbness since 2am. pt reports r cheek numbness with r hand "first 3 fingers" numbness. pt reports neverhad previous symptoms in the past. pt denies fever, vision changes, neck pain, headache, n/v.No weakness.  No Fall or LOC.  No c/o Headache or nausea vomiting.  Pt is Chinese speaking. Interview was conducted with daughter's help at bedside.    PT Tony. (27 Jun 2019 18:30)    Interval History -    Patient is seen, chart was reviewed and case was discussed with the treatment team.  Pt is not in any distress.   Daughter is at bedside.  No new complaints.  facial numbness is almost resolved. Right hand numbness remains.    Vital Signs Last 24 Hrs  T(C): 36.8 (29 Jun 2019 15:23), Max: 36.9 (29 Jun 2019 07:46)  T(F): 98.3 (29 Jun 2019 15:23), Max: 98.4 (29 Jun 2019 07:46)  HR: 59 (29 Jun 2019 15:23) (59 - 78)  BP: 109/63 (29 Jun 2019 15:23) (104/64 - 132/74)  BP(mean): --  RR: 18 (29 Jun 2019 15:23) (16 - 18)  SpO2: 99% (29 Jun 2019 15:23) (95% - 99%)    MEDICATIONS    aspirin  chewable 162 milliGRAM(s) Oral daily  atorvastatin 40 milliGRAM(s) Oral at bedtime  dextrose 40% Gel 15 Gram(s) Oral once PRN  dextrose 5%. 1000 milliLiter(s) IV Continuous <Continuous>  dextrose 50% Injectable 12.5 Gram(s) IV Push once  dextrose 50% Injectable 25 Gram(s) IV Push once  dextrose 50% Injectable 25 Gram(s) IV Push once  doxazosin 1 milliGRAM(s) Oral at bedtime  enoxaparin Injectable 40 milliGRAM(s) SubCutaneous daily  folic acid 1 milliGRAM(s) Oral daily  glucagon  Injectable 1 milliGRAM(s) IntraMuscular once PRN  insulin lispro (HumaLOG) corrective regimen sliding scale   SubCutaneous three times a day before meals  LORazepam   Injectable 1 milliGRAM(s) IntraMuscular every 6 hours PRN  losartan 25 milliGRAM(s) Oral daily  metFORMIN 1000 milliGRAM(s) Oral daily  multivitamin 1 Tablet(s) Oral daily  thiamine 100 milliGRAM(s) Oral daily    Allergies    No Known Allergies    REVIEW OF SYSTEMS:    CONSTITUTIONAL: No fever  EYES: No eye pain,   ENMT:  No sinus or throat pain  NECK: No pain or stiffness  RESPIRATORY: No cough, No hemoptysis; No shortness of breath  CARDIOVASCULAR: No acute chest pain, palpitations,  or leg swelling  GASTROINTESTINAL: No abdominal pain. No nausea, vomiting, or hematemesis;  No melena or hematochezia.  GENITOURINARY: No  hematuria, or incontinence  MUSCULOSKELETAL: No joint swelling; No extremity pain  SKIN: No itching, rashes, or lesions   LYMPH NODES: No enlarged glands  NEUROLOGICAL: No headaches, memory loss,   PSYCHIATRIC: No depression, anxiety, mood swings, or difficulty sleeping  ENDOCRINE: No heat or cold intolerance;   HEME/LYMPH: No easy bruising, or bleeding gums  Allergy/Immunology. No medication allergy. No seasonal allergies.    PHYSICAL EXAM:    GENERAL: NAD, well-groomed, well-developed  HEAD:  Atraumatic, Normocephalic  EYES: EOMI, PERRLA, conjunctiva and sclera clear  NECK: Supple, No JVD, thyroid non-palpable    On Neurological Examination:    Mental Status - Pt is alert, awake, oriented X3. Higher functions are intact.  Follows commands well and able to answer questions appropriately.    Speech -  Normal. Pt has no aphasia.    Cranial Nerves - Pupils 3 mm equal and reactive to light, extraocular eye movements intact. Pt has no visual field deficit. Pt has no facial asymmetry. Tongue - is in midline.    Motor Exam - 4 plus/5 all over, No drift. No shaking or tremors. Muscle tone - is normal all over. Moves all extremities equally. No asymmetry is seen.      Sensory Exam -  Complaints of tingling, Right face numbness is resolved.  Right hand 3 fingers numbness remains    Gait - Able to stand and walk unassisted.     Deep tendon Reflexes - 2 plus all over.    Coordination - Fine finger movements are normal on both sides. Finger to nose is also normal on both sides.       Romberg - Negative.    Neck Supple -  Yes.    LABS:    CBC Full  -  ( 28 Jun 2019 06:10 )  WBC Count : 6.34 K/uL  RBC Count : 4.10 M/uL  Hemoglobin : 13.1 g/dL  Hematocrit : 38.1 %  Platelet Count - Automated : 195 K/uL  Mean Cell Volume : 92.9 fl  Mean Cell Hemoglobin : 32.0 pg  Mean Cell Hemoglobin Concentration : 34.4 gm/dL    06-28    139  |  105  |  12  ----------------------------<  130<H>  3.7   |  27  |  0.85    Ca    8.6      28 Jun 2019 06:10      Hemoglobin A1C: Pending    RADIOLOGY & ADDITIONAL STUDIES:    < from: US Duplex Carotid Arteries Complete, Bilateral (06.27.19 @ 21:25) >  No duplex evidence of hemodynamically significant internal carotid artery   stenosis.       < end of copied text >      < from: MR Head No Cont (06.28.19 @ 09:56) >  Impression: Acute lacunar infarct left thalamus.    < end of copied text >      < from: US Duplex Carotid Arteries Complete, Bilateral (06.27.19 @ 21:25) >  IMPRESSION:    No duplex evidence of hemodynamically significant internal carotid artery   stenosis.     < end of copied text >      < from: CT Head No Cont (06.27.19 @ 14:52) >    No evidence of acute intracranial hemorrhage, midline shift or CT   evidence of acute territorial infarct.    < end of copied text >

## 2019-06-29 NOTE — PROGRESS NOTE ADULT - ASSESSMENT
Seen for Right face and hand numbness - still there.  R/o CVA  CT Head - No acute pathology.  Continue Ecotrin 162/Lipitor 40  MRI Brain - Acute Infract in left thalamus.  Carotid doppler - No stenosis  Lipid panel  - 25  HbA1c - 7.4  D/w dr. Clement.  DC planning.  D/w daughter at bedside. Questions answered. Stroke education is done.   DRAFT Seen for Right face and hand numbness  - Right face numbness is resolved.  Right hand 3 fingers numbness remains  CT Head - No acute pathology.  MRI Brain - Acute Infract in left thalamus.  Carotid doppler - No stenosis  Lipid panel  - 25  HbA1c - 7.4  Continue Ecotrin 162  Had hypoglycemia today. Received glucose. DC held.  DC planning for tomorrow.  Dx - CVA.  D/w daughter at bedside. Questions answered. Stroke education is done.  D/w Alisa Clement.

## 2019-06-29 NOTE — PROGRESS NOTE ADULT - ASSESSMENT
67yo male with pmhx of DM2, htn, hld on asa c/o numbness since 2am. pt reports r cheek numbness with r hand "first 3 fingers" numbness. pt reports neverhad previous symptoms in the past. pt denies fever, vision changes, neck pain, headache, n/v.No weakness.  No Fall or LOC.  No c/o Headache or nausea vomiting.  Pt is Chinese speaking. Interview was conducted with daughter's help at bedside.  In Ed /78, Hr 78, afebrile, Ct head negative.  had Neuro consult-NIHSS - 1TIA/CVA in left thlamic area clinically.Pt is not a tPa candidate secondary to woke up with the symptoms.  Admit to monitored bed.Was taking Ecotrin 81 mg at home would change to Ecotrin 162.Lipitor 40MRI Brain/Carotid doppler/2D Echo.Lipid panel/HbA1c.Dysphagia eval in ED.PT Eval.  cardiology consult noted.   < from: MR Head No Cont (06.28.19 @ 09:56) >      Age-appropriate parenchymal volume loss. Mild T2 prolongation signal   cerebral white matter bilaterally consistent with chronic ischemic   gliotic changes.  There is a diffusion positive acute lacunar infarct in the left thalamus,   also demonstrating T2 prolongation. No other ischemic or hemorrhagic foci   noted. No edema or mass effect. Parasellar region posterior fossa   structures unremarkable. Paranasal sinus mucosal thickening.    Impression: Acute lacunar infarct left thalamus.      smoking cessation discussed, counselled, better control of DM [id number]    60 minutes spent on this visit, 50% visit time spent in care co-ordination with other attendings and counselling patient

## 2019-06-30 VITALS
OXYGEN SATURATION: 94 % | HEART RATE: 71 BPM | TEMPERATURE: 98 F | SYSTOLIC BLOOD PRESSURE: 126 MMHG | RESPIRATION RATE: 16 BRPM | DIASTOLIC BLOOD PRESSURE: 78 MMHG

## 2019-06-30 LAB — GLUCOSE BLDC GLUCOMTR-MCNC: 127 MG/DL — HIGH (ref 70–99)

## 2019-06-30 PROCEDURE — 83036 HEMOGLOBIN GLYCOSYLATED A1C: CPT

## 2019-06-30 PROCEDURE — 93005 ELECTROCARDIOGRAM TRACING: CPT

## 2019-06-30 PROCEDURE — 80048 BASIC METABOLIC PNL TOTAL CA: CPT

## 2019-06-30 PROCEDURE — 85610 PROTHROMBIN TIME: CPT

## 2019-06-30 PROCEDURE — 85730 THROMBOPLASTIN TIME PARTIAL: CPT

## 2019-06-30 PROCEDURE — 93306 TTE W/DOPPLER COMPLETE: CPT

## 2019-06-30 PROCEDURE — 85027 COMPLETE CBC AUTOMATED: CPT

## 2019-06-30 PROCEDURE — 97161 PT EVAL LOW COMPLEX 20 MIN: CPT

## 2019-06-30 PROCEDURE — 70450 CT HEAD/BRAIN W/O DYE: CPT

## 2019-06-30 PROCEDURE — 93880 EXTRACRANIAL BILAT STUDY: CPT

## 2019-06-30 PROCEDURE — 71046 X-RAY EXAM CHEST 2 VIEWS: CPT

## 2019-06-30 PROCEDURE — 86803 HEPATITIS C AB TEST: CPT

## 2019-06-30 PROCEDURE — 82962 GLUCOSE BLOOD TEST: CPT

## 2019-06-30 PROCEDURE — 80053 COMPREHEN METABOLIC PANEL: CPT

## 2019-06-30 PROCEDURE — 99285 EMERGENCY DEPT VISIT HI MDM: CPT | Mod: 25

## 2019-06-30 PROCEDURE — 36415 COLL VENOUS BLD VENIPUNCTURE: CPT

## 2019-06-30 PROCEDURE — 80061 LIPID PANEL: CPT

## 2019-06-30 RX ORDER — FOLIC ACID 0.8 MG
1 TABLET ORAL
Qty: 0 | Refills: 0 | DISCHARGE
Start: 2019-06-30

## 2019-06-30 RX ORDER — ASPIRIN/CALCIUM CARB/MAGNESIUM 324 MG
2 TABLET ORAL
Qty: 0 | Refills: 0 | DISCHARGE
Start: 2019-06-30

## 2019-06-30 RX ORDER — ASPIRIN/CALCIUM CARB/MAGNESIUM 324 MG
1 TABLET ORAL
Qty: 0 | Refills: 0 | DISCHARGE

## 2019-06-30 RX ADMIN — ENOXAPARIN SODIUM 40 MILLIGRAM(S): 100 INJECTION SUBCUTANEOUS at 11:50

## 2019-06-30 RX ADMIN — LOSARTAN POTASSIUM 25 MILLIGRAM(S): 100 TABLET, FILM COATED ORAL at 05:55

## 2019-06-30 RX ADMIN — Medication 100 MILLIGRAM(S): at 11:52

## 2019-06-30 RX ADMIN — Medication 1 MILLIGRAM(S): at 11:51

## 2019-06-30 RX ADMIN — Medication 162 MILLIGRAM(S): at 11:50

## 2019-06-30 RX ADMIN — Medication 1 TABLET(S): at 11:50

## 2019-06-30 RX ADMIN — METFORMIN HYDROCHLORIDE 1000 MILLIGRAM(S): 850 TABLET ORAL at 11:51

## 2019-06-30 NOTE — PROGRESS NOTE ADULT - SUBJECTIVE AND OBJECTIVE BOX
Patient is a 68y old  Male who presents with a chief complaint of Right  face and hand numbness (29 Jun 2019 16:55)      INTERVAL HPI/OVERNIGHT EVENTS:  no ac event overnight  Home Medications:  aspirin 81 mg oral tablet: 1 tab(s) orally once a day (27 Jun 2019 15:37)  atorvastatin 40 mg oral tablet: 1 tab(s) orally once a day (27 Jun 2019 15:37)  Hytrin 2 mg oral capsule: 1 cap(s) orally once a day (at bedtime) (27 Jun 2019 15:37)  losartan 25 mg oral tablet: 1 tab(s) orally once a day (27 Jun 2019 15:37)  metFORMIN 1000 mg oral tablet: orally once a day (27 Jun 2019 15:37)      MEDICATIONS  (STANDING):  aspirin  chewable 162 milliGRAM(s) Oral daily  atorvastatin 40 milliGRAM(s) Oral at bedtime  dextrose 5%. 1000 milliLiter(s) (50 mL/Hr) IV Continuous <Continuous>  dextrose 50% Injectable 12.5 Gram(s) IV Push once  dextrose 50% Injectable 25 Gram(s) IV Push once  dextrose 50% Injectable 25 Gram(s) IV Push once  doxazosin 1 milliGRAM(s) Oral at bedtime  enoxaparin Injectable 40 milliGRAM(s) SubCutaneous daily  folic acid 1 milliGRAM(s) Oral daily  insulin lispro (HumaLOG) corrective regimen sliding scale   SubCutaneous three times a day before meals  losartan 25 milliGRAM(s) Oral daily  metFORMIN 1000 milliGRAM(s) Oral daily  multivitamin 1 Tablet(s) Oral daily  thiamine 100 milliGRAM(s) Oral daily    MEDICATIONS  (PRN):  dextrose 40% Gel 15 Gram(s) Oral once PRN Blood Glucose LESS THAN 70 milliGRAM(s)/deciliter  glucagon  Injectable 1 milliGRAM(s) IntraMuscular once PRN Glucose LESS THAN 70 milligrams/deciliter  LORazepam   Injectable 1 milliGRAM(s) IntraMuscular every 6 hours PRN Anxiety      Allergies    No Known Allergies    Intolerances        REVIEW OF SYSTEMS:  CONSTITUTIONAL: No fever, weight loss, or fatigue  EYES: No eye pain, visual disturbances, or discharge  ENMT:  No difficulty hearing, tinnitus, vertigo; No sinus or throat pain  NECK: No pain or stiffness  BREASTS: No pain, masses, or nipple discharge  RESPIRATORY: No cough, wheezing, chills or hemoptysis; No shortness of breath  CARDIOVASCULAR: No chest pain, palpitations, dizziness, or leg swelling  GASTROINTESTINAL: No abdominal or epigastric pain. No nausea, vomiting, or hematemesis; No diarrhea or constipation. No melena or hematochezia.  GENITOURINARY: No dysuria, frequency, hematuria, or incontinence  NEUROLOGICAL: No headaches, memory loss, loss of strength, has rt hand and face numbness, anbulating well  SKIN: No itching, burning, rashes, or lesions   LYMPH NODES: No enlarged glands  ENDOCRINE: No heat or cold intolerance; No hair loss  MUSCULOSKELETAL: No joint pain or swelling; No muscle, back, or extremity pain  PSYCHIATRIC: No depression, anxiety, mood swings, or difficulty sleeping  HEME/LYMPH: No easy bruising, or bleeding gums  ALLERGY AND IMMUNOLOGIC: No hives or eczema    Vital Signs Last 24 Hrs  T(C): 36.6 (30 Jun 2019 08:00), Max: 36.8 (29 Jun 2019 15:23)  T(F): 97.9 (30 Jun 2019 08:00), Max: 98.3 (29 Jun 2019 15:23)  HR: 71 (30 Jun 2019 08:00) (59 - 71)  BP: 126/78 (30 Jun 2019 08:00) (109/63 - 132/74)  BP(mean): --  RR: 16 (30 Jun 2019 08:00) (15 - 18)  SpO2: 94% (30 Jun 2019 08:00) (94% - 99%)    PHYSICAL EXAM:  GENERAL: well-groomed, well-developed  HEAD:  Atraumatic, Normocephalic  EYES: EOMI, PERRLA, conjunctiva and sclera clear  ENMT: Moist mucous membranes,   NECK: Supple, No JVD, Normal thyroid  NERVOUS SYSTEM:  Alert & Oriented X3, Good concentration; Motor Strength 5/5 B/L upper and lower extremities; DTRs 2+ intact and symmetric, rt facial and hand numbness,  CHEST/LUNG: Clear to percussion bilaterally; No rales, rhonchi, wheezing, or rubs  HEART: Regular rate and rhythm; No murmurs, rubs, or gallops  ABDOMEN: Soft, Nontender, Nondistended; Bowel sounds present  EXTREMITIES:  2+ Peripheral Pulses, No clubbing, cyanosis, or edema  LYMPH: No lymphadenopathy noted  SKIN: No rashes or lesions    LABS:              CAPILLARY BLOOD GLUCOSE      POCT Blood Glucose.: 127 mg/dL (30 Jun 2019 07:44)  POCT Blood Glucose.: 114 mg/dL (29 Jun 2019 22:04)  POCT Blood Glucose.: 108 mg/dL (29 Jun 2019 16:40)  POCT Blood Glucose.: 139 mg/dL (29 Jun 2019 11:46)          I&O's Summary      RADIOLOGY & ADDITIONAL TESTS:    Imaging Personally Reviewed:  [x ] YES  [ ] NO    Consultant(s) Notes Reviewed:  [x ] YES  [ ] NO    Care Discussed with Consultants/Other Providers [x ] YES  [ ] NO

## 2019-06-30 NOTE — PROGRESS NOTE ADULT - PROBLEM SELECTOR PLAN 1
asa, statin MRI , neuro f up PT eval, fall precautions, swallowing assesement,TTE carotid doppler
asas, statin MRI , neuro f up PT eval, fall precautions, swallowing assesement,TTE carotid doppler
asa, statin MRI , neuro f up PT eval, fall precautions, swallowing assesement,TTE carotid doppler

## 2019-06-30 NOTE — DISCHARGE NOTE PROVIDER - PROVIDER TOKENS
FREE:[LAST:[your pcp and neurologist],PHONE:[(   )    -],FAX:[(   )    -]],PROVIDER:[TOKEN:[[id number]]]

## 2019-06-30 NOTE — PROGRESS NOTE ADULT - PROBLEM SELECTOR PLAN 3
metformin and SSi consistent carb

## 2019-06-30 NOTE — DISCHARGE NOTE PROVIDER - HOSPITAL COURSE
69yo male with pmhx of DM2, htn, hld on asa c/o numbness since 2am. pt reports r cheek numbness with r hand "first 3 fingers" numbness. pt reports neverhad previous symptoms in the past. pt denies fever, vision changes, neck pain, headache, n/v.No weakness.    No Fall or LOC.    No c/o Headache or nausea vomiting.    Pt is Chinese speaking. Interview was conducted with daughter's help at bedside.    In Ed /78, Hr 78, afebrile, Ct head negative.    had Neuro consult-NIHSS - 1TIA/CVA in left thlamic area clinically.Pt is not a tPa candidate secondary to woke up with the symptoms.    Admit to monitored bed.Was taking Ecotrin 81 mg at home would change to Ecotrin 162.Lipitor 40MRI Brain/Carotid doppler/2D Echo.Lipid panel/HbA1c.Dysphagia eval in ED.PT Eval.    cardiology consult noted.     < from: MR Head No Cont (06.28.19 @ 09:56) >            Age-appropriate parenchymal volume loss. Mild T2 prolongation signal     cerebral white matter bilaterally consistent with chronic ischemic     gliotic changes.    There is a diffusion positive acute lacunar infarct in the left thalamus,     also demonstrating T2 prolongation. No other ischemic or hemorrhagic foci     noted. No edema or mass effect. Parasellar region posterior fossa     structures unremarkable. Paranasal sinus mucosal thickening.        Impression: Acute lacunar infarct left thalamus.            smoking cessation discussed, counselled, better control of DM hba1c7.4, ldl 25,    neuro f up noted stable for DC home out pt f up with Neurologist    discharge diagnosis, CVA Ac lacunar infarct in left thalamus, smoker, DM2 with hyperglycemia,ETOH abuse,h/o HLD, HTN,BPH stble.    cont [id number], statin, control DM@ and HTN HLD well, counselled to stop smoking and ETOH use.    Advanced care planning discussed with patient/family. Advaced care planning forms reviewed,discussed /completed, 20 min spent    Full code.        60 minutes spent on this visit, 50% visit time spent in care co-ordination with other attendings and counselling patient

## 2019-06-30 NOTE — DISCHARGE NOTE PROVIDER - CARE PROVIDER_API CALL
your pcp and neurologist,   Phone: (   )    -  Fax: (   )    -  Follow Up Time:     [place] [address]  Phone: [phone]  Fax: [phone]  Follow Up Time:

## 2019-06-30 NOTE — PROGRESS NOTE ADULT - PROBLEM SELECTOR PLAN 2
losartan and monior,cardio consult

## 2019-06-30 NOTE — PROGRESS NOTE ADULT - ASSESSMENT
67yo male with pmhx of DM2, htn, hld on asa c/o numbness since 2am. pt reports r cheek numbness with r hand "first 3 fingers" numbness. pt reports neverhad previous symptoms in the past. pt denies fever, vision changes, neck pain, headache, n/v.No weakness.  No Fall or LOC.  No c/o Headache or nausea vomiting.  Pt is Chinese speaking. Interview was conducted with daughter's help at bedside.  In Ed /78, Hr 78, afebrile, Ct head negative.  had Neuro consult-NIHSS - 1TIA/CVA in left thlamic area clinically.Pt is not a tPa candidate secondary to woke up with the symptoms.  Admit to monitored bed.Was taking Ecotrin 81 mg at home would change to Ecotrin 162.Lipitor 40MRI Brain/Carotid doppler/2D Echo.Lipid panel/HbA1c.Dysphagia eval in ED.PT Eval.  cardiology consult noted.   < from: MR Head No Cont (06.28.19 @ 09:56) >      Age-appropriate parenchymal volume loss. Mild T2 prolongation signal   cerebral white matter bilaterally consistent with chronic ischemic   gliotic changes.  There is a diffusion positive acute lacunar infarct in the left thalamus,   also demonstrating T2 prolongation. No other ischemic or hemorrhagic foci   noted. No edema or mass effect. Parasellar region posterior fossa   structures unremarkable. Paranasal sinus mucosal thickening.    Impression: Acute lacunar infarct left thalamus.      smoking cessation discussed, counselled, better control of DM hba1c7.4, ldl 25,  neuro f up noted stable for DC home out pt f up with Neurologist  discharge diagnosis, CVA Ac lacunar infarct in left thalamus, smoker, DM2 with hyperglycemia,ETOH abuse,h/o HLD, HTN,  cont [id number], statin, control DM@ and HTN HLD well, counselled to stop smoking and ETOH use.  Advanced care planning discussed with patient/family. Advaced care planning forms reviewed,discussed /completed, 20 min spent  Full code.    60 minutes spent on this visit, 50% visit time spent in care co-ordination with other attendings and counselling patient

## 2019-06-30 NOTE — DISCHARGE NOTE PROVIDER - NSDCCPCAREPLAN_GEN_ALL_CORE_FT
PRINCIPAL DISCHARGE DIAGNOSIS  Diagnosis: Ischemic cerebrovascular accident (CVA)  Assessment and Plan of Treatment: aspirine 162 mg daily, continue statin, control DM and HTN and HLD

## 2019-06-30 NOTE — DISCHARGE NOTE NURSING/CASE MANAGEMENT/SOCIAL WORK - NSDCPEPTSTRK_GEN_ALL_CORE
Need for follow up after discharge/Risk factors for stroke/Stroke warning signs and symptoms/Signs and symptoms of stroke/Prescribed medications/Stroke education booklet/Call 911 for stroke/Stroke support groups for patients, families, and friends

## 2019-06-30 NOTE — PROGRESS NOTE ADULT - SUBJECTIVE AND OBJECTIVE BOX
Neurology Follow up note    JESSICA GALICIA 68y Male came with left sided numbness    HPI: 67yo male with pmhx of DM2, htn, hld on asa c/o numbness since 2am. pt reports r cheek numbness with r hand "first 3 fingers" numbness. pt reports neverhad previous symptoms in the past. pt denies fever, vision changes, neck pain, headache, n/v.No weakness.  No Fall or LOC.  No c/o Headache or nausea vomiting.  Pt is Chinese speaking. Interview was conducted with daughter's help at bedside.    Interval History -    Patient is seen, chart was reviewed and case was discussed with the treatment team.  Pt is not in any distress.     Vital Signs Last 24 Hrs  T(C): 36.6 (30 Jun 2019 08:00), Max: 36.8 (29 Jun 2019 15:23)  T(F): 97.9 (30 Jun 2019 08:00), Max: 98.3 (29 Jun 2019 15:23)  HR: 71 (30 Jun 2019 08:00) (59 - 71)  BP: 126/78 (30 Jun 2019 08:00) (109/63 - 132/74)  BP(mean): --  RR: 16 (30 Jun 2019 08:00) (15 - 18)  SpO2: 94% (30 Jun 2019 08:00) (94% - 99%)    MEDICATIONS    aspirin  chewable 162 milliGRAM(s) Oral daily  dextrose 40% Gel 15 Gram(s) Oral once PRN  dextrose 5%. 1000 milliLiter(s) IV Continuous <Continuous>  dextrose 50% Injectable 12.5 Gram(s) IV Push once  dextrose 50% Injectable 25 Gram(s) IV Push once  dextrose 50% Injectable 25 Gram(s) IV Push once  doxazosin 1 milliGRAM(s) Oral at bedtime  enoxaparin Injectable 40 milliGRAM(s) SubCutaneous daily  folic acid 1 milliGRAM(s) Oral daily  glucagon  Injectable 1 milliGRAM(s) IntraMuscular once PRN  insulin lispro (HumaLOG) corrective regimen sliding scale   SubCutaneous three times a day before meals  LORazepam   Injectable 1 milliGRAM(s) IntraMuscular every 6 hours PRN  losartan 25 milliGRAM(s) Oral daily  metFORMIN 1000 milliGRAM(s) Oral daily  multivitamin 1 Tablet(s) Oral daily  thiamine 100 milliGRAM(s) Oral daily    Allergies    No Known Allergies      REVIEW OF SYSTEMS:    CONSTITUTIONAL: No fever  EYES: No eye pain,   ENMT:  No sinus or throat pain  NECK: No pain or stiffness  RESPIRATORY: No cough, No hemoptysis; No shortness of breath  CARDIOVASCULAR: No acute chest pain, palpitations,  or leg swelling  GASTROINTESTINAL: No abdominal pain. No nausea, vomiting, or hematemesis;  No melena or hematochezia.  GENITOURINARY: No  hematuria, or incontinence  MUSCULOSKELETAL: No joint swelling; No extremity pain  SKIN: No itching, rashes, or lesions   LYMPH NODES: No enlarged glands  NEUROLOGICAL: No headaches, memory loss,   PSYCHIATRIC: No depression, anxiety, mood swings, or difficulty sleeping  ENDOCRINE: No heat or cold intolerance;   HEME/LYMPH: No easy bruising, or bleeding gums  Allergy/Immunology. No medication allergy. No seasonal allergies.    PHYSICAL EXAM:    GENERAL: NAD, well-groomed, well-developed  HEAD:  Atraumatic, Normocephalic  EYES: EOMI, PERRLA, conjunctiva and sclera clear  NECK: Supple, No JVD, thyroid non-palpable    On Neurological Examination:    Mental Status - Pt is alert, awake, oriented X3. Higher functions are intact.  Follows commands well and able to answer questions appropriately.    Speech -  Normal. Pt has no aphasia.    Cranial Nerves - Pupils 3 mm equal and reactive to light, extraocular eye movements intact. Pt has no visual field deficit. Pt has no facial asymmetry. Tongue - is in midline.    Motor Exam - 4 plus/5 all over, No drift. No shaking or tremors. Muscle tone - is normal all over. Moves all extremities equally. No asymmetry is seen.      Sensory Exam -  Complaints of tingling, Right face numbness is resolved.  Right hand 3 fingers numbness remains    Gait - Able to stand and walk unassisted.     Deep tendon Reflexes - 2 plus all over.    Coordination - Fine finger movements are normal on both sides. Finger to nose is also normal on both sides.       Romberg - Negative.    Neck Supple -  Yes.    LABS:    CBC Full  -  ( 28 Jun 2019 06:10 )  WBC Count : 6.34 K/uL  RBC Count : 4.10 M/uL  Hemoglobin : 13.1 g/dL  Hematocrit : 38.1 %  Platelet Count - Automated : 195 K/uL  Mean Cell Volume : 92.9 fl  Mean Cell Hemoglobin : 32.0 pg  Mean Cell Hemoglobin Concentration : 34.4 gm/dL    06-28    139  |  105  |  12  ----------------------------<  130<H>  3.7   |  27  |  0.85    Ca    8.6      28 Jun 2019 06:10      Hemoglobin A1C: Pending    RADIOLOGY & ADDITIONAL STUDIES:    < from: US Duplex Carotid Arteries Complete, Bilateral (06.27.19 @ 21:25) >  No duplex evidence of hemodynamically significant internal carotid artery   stenosis.       < end of copied text >    < from: MR Head No Cont (06.28.19 @ 09:56) >  Impression: Acute lacunar infarct left thalamus.    < end of copied text >      < from: US Duplex Carotid Arteries Complete, Bilateral (06.27.19 @ 21:25) >  IMPRESSION:    No duplex evidence of hemodynamically significant internal carotid artery   stenosis.     < end of copied text >    < from: CT Head No Cont (06.27.19 @ 14:52) >    No evidence of acute intracranial hemorrhage, midline shift or CT   evidence of acute territorial infarct.    < end of copied text > Neurology Follow up note    JESSICA GALICIA 68y Male came with RIGHT sided numbness    HPI: Pt is a 67yo male with PMHx of DM2, HTN, Hypercholesterolemia came to ED with c/o numbness since 2am. Pt reports Right cheek numbness with Right hand "first 3 fingers" numbness. pt reports never had previous symptoms in the past. Pt denies fever, vision changes, neck pain, headache, n/v.  No weakness.  No Fall or LOC.  No c/o Headache or nausea vomiting.  Pt is Chinese speaking. Interview was conducted with daughter's help at bedside.  NIHSS - 1  CT Head - No acute pathology.  Pt is not a tPa candidate secondary to woke up with the symptoms.    Interval History -    Patient is seen, chart was reviewed and case was discussed with the treatment team.  Pt is not in any distress.     Vital Signs Last 24 Hrs  T(C): 36.6 (30 Jun 2019 08:00), Max: 36.8 (29 Jun 2019 15:23)  T(F): 97.9 (30 Jun 2019 08:00), Max: 98.3 (29 Jun 2019 15:23)  HR: 71 (30 Jun 2019 08:00) (59 - 71)  BP: 126/78 (30 Jun 2019 08:00) (109/63 - 132/74)  BP(mean): --  RR: 16 (30 Jun 2019 08:00) (15 - 18)  SpO2: 94% (30 Jun 2019 08:00) (94% - 99%)    MEDICATIONS    aspirin  chewable 162 milliGRAM(s) Oral daily  dextrose 40% Gel 15 Gram(s) Oral once PRN  dextrose 5%. 1000 milliLiter(s) IV Continuous <Continuous>  dextrose 50% Injectable 12.5 Gram(s) IV Push once  dextrose 50% Injectable 25 Gram(s) IV Push once  dextrose 50% Injectable 25 Gram(s) IV Push once  doxazosin 1 milliGRAM(s) Oral at bedtime  enoxaparin Injectable 40 milliGRAM(s) SubCutaneous daily  folic acid 1 milliGRAM(s) Oral daily  glucagon  Injectable 1 milliGRAM(s) IntraMuscular once PRN  insulin lispro (HumaLOG) corrective regimen sliding scale   SubCutaneous three times a day before meals  LORazepam   Injectable 1 milliGRAM(s) IntraMuscular every 6 hours PRN  losartan 25 milliGRAM(s) Oral daily  metFORMIN 1000 milliGRAM(s) Oral daily  multivitamin 1 Tablet(s) Oral daily  thiamine 100 milliGRAM(s) Oral daily    Allergies    No Known Allergies      REVIEW OF SYSTEMS:    CONSTITUTIONAL: No fever  EYES: No eye pain,   ENMT:  No sinus or throat pain  NECK: No pain or stiffness  RESPIRATORY: No cough, No hemoptysis; No shortness of breath  CARDIOVASCULAR: No acute chest pain, palpitations,  or leg swelling  GASTROINTESTINAL: No abdominal pain. No nausea, vomiting, or hematemesis;  No melena or hematochezia.  GENITOURINARY: No  hematuria, or incontinence  MUSCULOSKELETAL: No joint swelling; No extremity pain  SKIN: No itching, rashes, or lesions   LYMPH NODES: No enlarged glands  NEUROLOGICAL: No headaches, memory loss,   PSYCHIATRIC: No depression, anxiety, mood swings, or difficulty sleeping  ENDOCRINE: No heat or cold intolerance;   HEME/LYMPH: No easy bruising, or bleeding gums  Allergy/Immunology. No medication allergy. No seasonal allergies.    PHYSICAL EXAM:    GENERAL: NAD, well-groomed, well-developed  HEAD:  Atraumatic, Normocephalic  EYES: EOMI, PERRLA, conjunctiva and sclera clear  NECK: Supple, No JVD, thyroid non-palpable    On Neurological Examination:    Mental Status - Pt is alert, awake, oriented X3. Higher functions are intact.  Follows commands well and able to answer questions appropriately.    Speech -  Normal. Pt has no aphasia.    Cranial Nerves - Pupils 3 mm equal and reactive to light, extraocular eye movements intact. Pt has no visual field deficit. Pt has no facial asymmetry. Tongue - is in midline.    Motor Exam - 4 plus/5 all over, No drift. No shaking or tremors. Muscle tone - is normal all over. Moves all extremities equally. No asymmetry is seen.      Sensory Exam -  Complaints of tingling, Right face numbness is resolved.  Right hand 3 fingers numbness remains    Gait - Able to stand and walk unassisted.     Deep tendon Reflexes - 2 plus all over.    Coordination - Fine finger movements are normal on both sides. Finger to nose is also normal on both sides.       Romberg - Negative.    Neck Supple -  Yes.    LABS:    CBC Full  -  ( 28 Jun 2019 06:10 )  WBC Count : 6.34 K/uL  RBC Count : 4.10 M/uL  Hemoglobin : 13.1 g/dL  Hematocrit : 38.1 %  Platelet Count - Automated : 195 K/uL  Mean Cell Volume : 92.9 fl  Mean Cell Hemoglobin : 32.0 pg  Mean Cell Hemoglobin Concentration : 34.4 gm/dL    06-28    139  |  105  |  12  ----------------------------<  130<H>  3.7   |  27  |  0.85    Ca    8.6      28 Jun 2019 06:10      Hemoglobin A1C: Pending    RADIOLOGY & ADDITIONAL STUDIES:    < from: US Duplex Carotid Arteries Complete, Bilateral (06.27.19 @ 21:25) >  No duplex evidence of hemodynamically significant internal carotid artery   stenosis.       < end of copied text >    < from: MR Head No Cont (06.28.19 @ 09:56) >  Impression: Acute lacunar infarct left thalamus.    < end of copied text >      < from: US Duplex Carotid Arteries Complete, Bilateral (06.27.19 @ 21:25) >  IMPRESSION:    No duplex evidence of hemodynamically significant internal carotid artery   stenosis.     < end of copied text >    < from: CT Head No Cont (06.27.19 @ 14:52) >    No evidence of acute intracranial hemorrhage, midline shift or CT   evidence of acute territorial infarct.    < end of copied text >

## 2019-06-30 NOTE — PROGRESS NOTE ADULT - REASON FOR ADMISSION
Right  face and hand numbness
rt sided weakness and numbness
sob
weakness

## 2019-06-30 NOTE — DISCHARGE NOTE NURSING/CASE MANAGEMENT/SOCIAL WORK - NSDCDPATPORTLINK_GEN_ALL_CORE
You can access the [facility] Patient Portal, offered by [facility], by registering with the following website: [email or web address]

## 2019-06-30 NOTE — PROGRESS NOTE ADULT - ASSESSMENT
67yo male with pmhx of DM2, htn, hld on asa 81  came to ED with c/o numbness  at Right face and right hand numbness  - Right face numbness is resolved.  Right hand 3 fingers numbness remains  CT  in ED - No acute pathology.  Was NOT a tpa Candidate - Woke up with symptoms.  MRI Brain - Acute Infract in left thalamus.  Carotid doppler - No stenosis  LDL -   HbA1c - 7.4  Continue Ecotrin 162  Dx - CVA.  DC NIHSS - 1  MRS - 0  Neurologically pt could be discharged home.  F/up in my office on 7/23/19 at 2 pm  D/w daughter at bedside. Questions answered. Stroke education is done.  D/w Alisa Clement./ 69yo male with pmhx of DM2, htn, hld on asa 81  came to ED with c/o numbness  at Right face and right hand numbness  - Right face numbness is resolved.  Right hand 3 fingers numbness remains  CT  in ED - No acute pathology.  Was NOT a tpa Candidate - Woke up with symptoms.  MRI Brain - Acute Infract in left thalamus.  Carotid doppler - No stenosis  LDL - 25  HbA1c - 7.4  Continue Ecotrin 162  Dx - CVA.  DC NIHSS - 1  MRS - 0  Neurologically pt could be discharged home.  F/up in my office on 7/23/19 at 2 pm  D/w daughter at bedside. Questions answered. Stroke education is done.  D/w Alisa Clement./

## 2019-11-21 NOTE — ED PROVIDER NOTE - INTERPRETATION SERVICES DECLINED
[MRI] : MRI [FreeTextEntry1] : MRI L-spine (Feb 2019) at [facility] in [place]: right convex scoliosis; midline posterior HNP and radial tear L2-3 and L3-4; L4-5 and L5-S1 HNP and annular tears; spinal stenosis L2-5.  Neural impingement upon b/l S1 NRs. Patient/Caregiver requests family/friend to interpret.

## 2022-02-18 NOTE — PATIENT PROFILE ADULT - NSPROEDALEARNPREF_GEN_A_NUR
Called to patient's room at approximately 2 pm regarding increased worked of breathing and hypoxemia necessitating NRB. On evaluation patient with accessory muscle use but remaining responsive and able to answer questions similar to AM exam. Additionally noted to be tachycardic to 120s. Lung exam notable for b/l rales. Stat CXR suggestive of pulmonary edema. 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 Given hx of CHF, will diurese with IV Lasix 60 mg. Bipap initiated for increased WOB. PE additionally on differential though lower likelihood given clear worsening CXR suggestive of ADHF, and pt on pharmacologic DVT ppx. Tn pending. Will continue to monitor.     Addendum: Tn 3.384 more c/w downtrending troponin from recent NSTEMI (Peaked at 20.386 2/4/22) as opposed to new ischemic event. Pt CP free, normotensive. Will not trend at this time. Repeat EKG, Tn for episodes of CP    Christiano Christy MD  PGY3  [facility]  02/18/2022     
audio

## 2023-05-04 NOTE — H&P ADULT - BACK
2022 - last stress test -  Normal myocardial perfusion scan. There is no evidence of myocardial ischemia or infarction.    The gated perfusion images showed an ejection fraction of 74% at rest. The gated perfusion images showed an ejection fraction of 82% post stress.    The EKG portion of this study is negative for ischemia.    The patient reported no chest pain during the stress test.    During stress, rare PVCs are noted.    Continue Coreg, ASA and STATIN  Follow up with Cardiology   No deformity or limitation of movement

## 2024-08-23 NOTE — PROGRESS NOTE ADULT - ASSESSMENT
Seen for Right face and hand numbness - still there.  R/o CVA  CT Head - No acute pathology.  Continue Ecotrin 162/Lipitor 40  MRI Brain today.  Carotid doppler - No stenosis  Lipid panel  -Pending  HbA1c - Pending  D/w dr. Clement.  Would continue to follow. Detail Level: Detailed Detail Level: Zone
